# Patient Record
Sex: MALE | Race: WHITE | NOT HISPANIC OR LATINO | Employment: UNEMPLOYED | URBAN - METROPOLITAN AREA
[De-identification: names, ages, dates, MRNs, and addresses within clinical notes are randomized per-mention and may not be internally consistent; named-entity substitution may affect disease eponyms.]

---

## 2017-03-22 ENCOUNTER — APPOINTMENT (OUTPATIENT)
Dept: OCCUPATIONAL MEDICINE | Facility: CLINIC | Age: 37
End: 2017-03-22

## 2017-03-22 PROCEDURE — 99000 SPECIMEN HANDLING OFFICE-LAB: CPT | Performed by: PHYSICIAN ASSISTANT

## 2017-08-08 ENCOUNTER — HOSPITAL ENCOUNTER (EMERGENCY)
Facility: CLINIC | Age: 37
Discharge: HOME | End: 2017-08-08
Attending: FAMILY MEDICINE | Admitting: FAMILY MEDICINE

## 2017-08-08 ENCOUNTER — TELEPHONE (OUTPATIENT)
Dept: FAMILY MEDICINE | Facility: CLINIC | Age: 37
End: 2017-08-08

## 2017-08-08 VITALS
HEIGHT: 72 IN | WEIGHT: 300 LBS | DIASTOLIC BLOOD PRESSURE: 103 MMHG | HEART RATE: 86 BPM | TEMPERATURE: 98.2 F | BODY MASS INDEX: 40.63 KG/M2 | OXYGEN SATURATION: 98 % | SYSTOLIC BLOOD PRESSURE: 200 MMHG | RESPIRATION RATE: 20 BRPM

## 2017-08-08 DIAGNOSIS — I10 HYPERTENSION, UNCONTROLLED: ICD-10-CM

## 2017-08-08 DIAGNOSIS — F10.10 ALCOHOL ABUSE: ICD-10-CM

## 2017-08-08 DIAGNOSIS — R73.9 HYPERGLYCEMIA: ICD-10-CM

## 2017-08-08 DIAGNOSIS — F17.200 TOBACCO DEPENDENCE SYNDROME: ICD-10-CM

## 2017-08-08 DIAGNOSIS — E66.01 MORBID OBESITY DUE TO EXCESS CALORIES (H): ICD-10-CM

## 2017-08-08 LAB
ALBUMIN SERPL-MCNC: 3.7 G/DL (ref 3.4–5)
ALBUMIN UR-MCNC: NEGATIVE MG/DL
ALP SERPL-CCNC: 71 U/L (ref 40–150)
ALT SERPL W P-5'-P-CCNC: 70 U/L (ref 0–70)
ANION GAP SERPL CALCULATED.3IONS-SCNC: 6 MMOL/L (ref 3–14)
APPEARANCE UR: CLEAR
AST SERPL W P-5'-P-CCNC: 70 U/L (ref 0–45)
BASOPHILS # BLD AUTO: 0 10E9/L (ref 0–0.2)
BASOPHILS NFR BLD AUTO: 0.4 %
BILIRUB DIRECT SERPL-MCNC: 0.2 MG/DL (ref 0–0.2)
BILIRUB SERPL-MCNC: 1 MG/DL (ref 0.2–1.3)
BILIRUB UR QL STRIP: NEGATIVE
BUN SERPL-MCNC: 15 MG/DL (ref 7–30)
CALCIUM SERPL-MCNC: 9.2 MG/DL (ref 8.5–10.1)
CHLORIDE SERPL-SCNC: 105 MMOL/L (ref 94–109)
CO2 SERPL-SCNC: 27 MMOL/L (ref 20–32)
COLOR UR AUTO: YELLOW
CREAT SERPL-MCNC: 0.76 MG/DL (ref 0.66–1.25)
DIFFERENTIAL METHOD BLD: NORMAL
EOSINOPHIL # BLD AUTO: 0.3 10E9/L (ref 0–0.7)
EOSINOPHIL NFR BLD AUTO: 3.7 %
ERYTHROCYTE [DISTWIDTH] IN BLOOD BY AUTOMATED COUNT: 12.3 % (ref 10–15)
GFR SERPL CREATININE-BSD FRML MDRD: ABNORMAL ML/MIN/1.7M2
GLUCOSE SERPL-MCNC: 213 MG/DL (ref 70–99)
GLUCOSE UR STRIP-MCNC: >1000 MG/DL
HCT VFR BLD AUTO: 43.8 % (ref 40–53)
HGB BLD-MCNC: 15.2 G/DL (ref 13.3–17.7)
HGB UR QL STRIP: NEGATIVE
IMM GRANULOCYTES # BLD: 0 10E9/L (ref 0–0.4)
IMM GRANULOCYTES NFR BLD: 0.1 %
KETONES UR STRIP-MCNC: NEGATIVE MG/DL
LEUKOCYTE ESTERASE UR QL STRIP: NEGATIVE
LYMPHOCYTES # BLD AUTO: 1.4 10E9/L (ref 0.8–5.3)
LYMPHOCYTES NFR BLD AUTO: 19.7 %
MCH RBC QN AUTO: 31.7 PG (ref 26.5–33)
MCHC RBC AUTO-ENTMCNC: 34.7 G/DL (ref 31.5–36.5)
MCV RBC AUTO: 91 FL (ref 78–100)
MONOCYTES # BLD AUTO: 0.6 10E9/L (ref 0–1.3)
MONOCYTES NFR BLD AUTO: 8.2 %
MUCOUS THREADS #/AREA URNS LPF: PRESENT /LPF
NEUTROPHILS # BLD AUTO: 4.7 10E9/L (ref 1.6–8.3)
NEUTROPHILS NFR BLD AUTO: 67.9 %
NITRATE UR QL: NEGATIVE
PH UR STRIP: 6.5 PH (ref 5–7)
PLATELET # BLD AUTO: 179 10E9/L (ref 150–450)
POTASSIUM SERPL-SCNC: 3.8 MMOL/L (ref 3.4–5.3)
PROT SERPL-MCNC: 7.8 G/DL (ref 6.8–8.8)
RBC # BLD AUTO: 4.8 10E12/L (ref 4.4–5.9)
RBC #/AREA URNS AUTO: <1 /HPF (ref 0–2)
SODIUM SERPL-SCNC: 138 MMOL/L (ref 133–144)
SP GR UR STRIP: 1.01 (ref 1–1.03)
TSH SERPL DL<=0.005 MIU/L-ACNC: 1.41 MU/L (ref 0.4–4)
URN SPEC COLLECT METH UR: ABNORMAL
UROBILINOGEN UR STRIP-MCNC: NORMAL MG/DL (ref 0–2)
WBC # BLD AUTO: 7 10E9/L (ref 4–11)
WBC #/AREA URNS AUTO: <1 /HPF (ref 0–2)

## 2017-08-08 PROCEDURE — 99284 EMERGENCY DEPT VISIT MOD MDM: CPT | Mod: 25 | Performed by: FAMILY MEDICINE

## 2017-08-08 PROCEDURE — 81001 URINALYSIS AUTO W/SCOPE: CPT | Performed by: FAMILY MEDICINE

## 2017-08-08 PROCEDURE — 83036 HEMOGLOBIN GLYCOSYLATED A1C: CPT | Performed by: PHYSICIAN ASSISTANT

## 2017-08-08 PROCEDURE — 93005 ELECTROCARDIOGRAM TRACING: CPT | Performed by: FAMILY MEDICINE

## 2017-08-08 PROCEDURE — 80076 HEPATIC FUNCTION PANEL: CPT | Performed by: FAMILY MEDICINE

## 2017-08-08 PROCEDURE — 93010 ELECTROCARDIOGRAM REPORT: CPT | Mod: Z6 | Performed by: FAMILY MEDICINE

## 2017-08-08 PROCEDURE — 80048 BASIC METABOLIC PNL TOTAL CA: CPT | Performed by: FAMILY MEDICINE

## 2017-08-08 PROCEDURE — 84443 ASSAY THYROID STIM HORMONE: CPT | Performed by: FAMILY MEDICINE

## 2017-08-08 PROCEDURE — 99284 EMERGENCY DEPT VISIT MOD MDM: CPT | Performed by: FAMILY MEDICINE

## 2017-08-08 PROCEDURE — 85025 COMPLETE CBC W/AUTO DIFF WBC: CPT | Performed by: FAMILY MEDICINE

## 2017-08-08 PROCEDURE — 36415 COLL VENOUS BLD VENIPUNCTURE: CPT | Performed by: PHYSICIAN ASSISTANT

## 2017-08-08 RX ORDER — LISINOPRIL/HYDROCHLOROTHIAZIDE 10-12.5 MG
1 TABLET ORAL DAILY
Qty: 30 TABLET | Refills: 1 | Status: SHIPPED | OUTPATIENT
Start: 2017-08-08 | End: 2017-10-17

## 2017-08-08 NOTE — ED AVS SNAPSHOT
[facility] Emergency Department    [address] [facility] [address]    Phone:  [phone]    Fax:  [phone]                                       Mt Painting   MRN: [id number]    Department:  [facility] Emergency Department   Date of Visit:  8/8/2017           Patient Information     Date Of Birth          1980        Your diagnoses for this visit were:     Hypertension, uncontrolled     Tobacco dependence syndrome     Alcohol abuse     Morbid obesity due to excess calories (H)     Hyperglycemia        You were seen by Hannah, Froylan FITCH MD.      Follow-up Information     Follow up with [facility] Emergency Department.    Specialty:  EMERGENCY MEDICINE    Why:  If symptoms worsen    Contact information:    [address] [facility] [address] [phone]    Additional information:    The medical center is located at   [address] [facility]. (between [address]   [address] in [place], [place] miles north   of [place]).        Follow up with Vern Wheatley PA-C.    Specialty:  Physician Assistant    Why:  Appointment made for 8/10/17 at 3:20 PM    Contact information:    [address] [place] [address]  [phone]        Discharge References/Attachments     ALCOHOL ABUSE (ENGLISH)    DIABETES, DO YOU HAVE? (ENGLISH)    HIGH BLOOD PRESSURE (HYPERTENSION), DISCHARGE INSTRUCTIONS (ENGLISH)    SMOKING, TIPS FOR QUITTING (CARDIOVASCULAR) (ENGLISH)    WEIGHT MANAGEMENT: EXERCISE AND ACTIVITY (ENGLISH)      Future Appointments        Provider Department Dept Phone Center    8/10/2017 3:20 PM Vern Wheatley PA-C [facility] [phone] [facility]      24 Hour Appointment Hotline       To make an appointment at any [facility], call [phone] ([phone]). If you don't have a family doctor or clinic, we will help you find one. [facility] clinics are conveniently located to serve the needs of you and your family.             Review of your  medicines      START taking        Dose / Directions Last dose taken    lisinopril-hydrochlorothiazide 10-12.5 MG per tablet   Commonly known as:  PRINZIDE/ZESTORETIC   Dose:  1 tablet   Quantity:  30 tablet        Take 1 tablet by mouth daily   Refills:  1                Prescriptions were sent or printed at these locations (1 Prescription)                   [id number] - [place], [place]  [address]   [address] [facility] [address]    Telephone:  [phone]   Fax:  [phone]   Hours:  Closed 10-16-08 business to [facility]                E-Prescribed (1 of 1)         lisinopril-hydrochlorothiazide (PRINZIDE/ZESTORETIC) 10-12.5 MG per tablet                Procedures and tests performed during your visit     Basic metabolic panel    CBC with platelets differential    EKG 12-lead    Hepatic panel    TSH with free T4 reflex    UA with Microscopic reflex to Culture      Orders Needing Specimen Collection     None      Pending Results     No orders found from 8/6/2017 to 8/9/2017.            Pending Culture Results     No orders found from 8/6/2017 to 8/9/2017.            Pending Results Instructions     If you had any lab results that were not finalized at the time of your Discharge, you can call the ED Lab Result RN at [phone]. You will be contacted by this team for any positive Lab results or changes in treatment. The nurses are available 7 days a week from 10A to 6:30P.  You can leave a message 24 hours per day and they will return your call.        Test Results From Your Hospital Stay        8/8/2017  1:10 PM      Component Results     Component Value Ref Range & Units Status    WBC 7.0 4.0 - 11.0 10e9/L Final    RBC Count 4.80 4.4 - 5.9 10e12/L Final    Hemoglobin 15.2 13.3 - 17.7 g/dL Final    Hematocrit 43.8 40.0 - 53.0 % Final    MCV 91 78 - 100 fl Final    MCH 31.7 26.5 - 33.0 pg Final    MCHC 34.7 31.5 - 36.5 g/dL Final    RDW 12.3 10.0 - 15.0 % Final    Platelet  Count 179 150 - 450 10e9/L Final    Diff Method Automated Method  Final    % Neutrophils 67.9 % Final    % Lymphocytes 19.7 % Final    % Monocytes 8.2 % Final    % Eosinophils 3.7 % Final    % Basophils 0.4 % Final    % Immature Granulocytes 0.1 % Final    Absolute Neutrophil 4.7 1.6 - 8.3 10e9/L Final    Absolute Lymphocytes 1.4 0.8 - 5.3 10e9/L Final    Absolute Monocytes 0.6 0.0 - 1.3 10e9/L Final    Absolute Eosinophils 0.3 0.0 - 0.7 10e9/L Final    Absolute Basophils 0.0 0.0 - 0.2 10e9/L Final    Abs Immature Granulocytes 0.0 0 - 0.4 10e9/L Final         8/8/2017  1:24 PM      Component Results     Component Value Ref Range & Units Status    Sodium 138 133 - 144 mmol/L Final    Potassium 3.8 3.4 - 5.3 mmol/L Final    Chloride 105 94 - 109 mmol/L Final    Carbon Dioxide 27 20 - 32 mmol/L Final    Anion Gap 6 3 - 14 mmol/L Final    Glucose 213 (H) 70 - 99 mg/dL Final    Urea Nitrogen 15 7 - 30 mg/dL Final    Creatinine 0.76 0.66 - 1.25 mg/dL Final    GFR Estimate >90  Non  GFR Calc   >60 mL/min/1.7m2 Final    GFR Estimate If Black >90   GFR Calc   >60 mL/min/1.7m2 Final    Calcium 9.2 8.5 - 10.1 mg/dL Final         8/8/2017  1:27 PM      Component Results     Component Value Ref Range & Units Status    Bilirubin Direct 0.2 0.0 - 0.2 mg/dL Final    Bilirubin Total 1.0 0.2 - 1.3 mg/dL Final    Albumin 3.7 3.4 - 5.0 g/dL Final    Protein Total 7.8 6.8 - 8.8 g/dL Final    Alkaline Phosphatase 71 40 - 150 U/L Final    ALT 70 0 - 70 U/L Final    AST 70 (H) 0 - 45 U/L Final         8/8/2017  1:32 PM      Component Results     Component Value Ref Range & Units Status    TSH 1.41 0.40 - 4.00 mU/L Final         8/8/2017  4:01 PM      Component Results     Component Value Ref Range & Units Status    Color Urine Yellow  Final    Appearance Urine Clear  Final    Glucose Urine >1000 (A) NEG mg/dL Final    Bilirubin Urine Negative NEG Final    Ketones Urine Negative NEG mg/dL Final    Specific  "[place] Urine 1.006 1.003 - 1.035 Final    Blood Urine Negative NEG Final    pH Urine 6.5 5.0 - 7.0 pH Final    Protein Albumin Urine Negative NEG mg/dL Final    Urobilinogen mg/dL Normal 0.0 - 2.0 mg/dL Final    Nitrite Urine Negative NEG Final    Leukocyte Esterase Urine Negative NEG Final    Source Midstream Urine  Final    WBC Urine <1 0 - 2 /HPF Final    RBC Urine <1 0 - 2 /HPF Final    Mucous Urine Present (A) NEG /LPF Final                Thank you for choosing [facility]       Thank you for choosing [facility] for your care. Our goal is always to provide you with excellent care. Hearing back from our patients is one way we can continue to improve our services. Please take a few minutes to complete the written survey that you may receive in the mail after you visit with us. Thank you!        [employer] Information     [employer] lets you send messages to your doctor, view your test results, renew your prescriptions, schedule appointments and more. To sign up, go to [email or web address] . Click on \"Log in\" on the left side of the screen, which will take you to the Welcome page. Then click on \"Sign up Now\" on the right side of the page.     You will be asked to enter the access code listed below, as well as some personal information. Please follow the directions to create your username and password.     Your access code is: [email or web address]  Expires: 2017  4:31 PM     Your access code will  in 90 days. If you need help or a new code, please call your [facility] clinic or [phone].        Care EveryWhere ID     This is your Care EveryWhere ID. This could be used by other organizations to access your [facility] medical records  [id number]        Equal Access to Services     LAURA EDWARDS : Hadii pradip Agarwal, dasha norman, aicha kaalmada charlotte, rosalinda sherman. So [facility] [phone].    ATENCIÓN: Si habla español, tiene a cramer disposición servicios gratuitos de " asistencia lingüística. Jhonny al [phone].    We comply with applicable federal civil rights laws and [place] laws. We do not discriminate on the basis of race, color, national origin, age, disability sex, sexual orientation or gender identity.            After Visit Summary       This is your record. Keep this with you and show to your community pharmacist(s) and doctor(s) at your next visit.

## 2017-08-08 NOTE — TELEPHONE ENCOUNTER
Reason for Call:  Other     Detailed comments: Patient has chest pressure on left side of chest - please call pt   Phone Number Patient can be reached at: Home number on file [phone] (home)    Best Time:     Can we leave a detailed message on this number? YES    Call taken on 8/8/2017 at 8:52 AM by Barbi Trinh

## 2017-08-08 NOTE — ED AVS SNAPSHOT
[facility] Emergency Department    [address] [facility] [address]    Phone:  [phone]    Fax:  [phone]                                       Mt Painting   MRN: [id number]    Department:  [facility] Emergency Department   Date of Visit:  8/8/2017           After Visit Summary Signature Page     I have received my discharge instructions, and my questions have been answered. I have discussed any challenges I see with this plan with the nurse or doctor.    ..........................................................................................................................................  Patient/Patient Representative Signature      ..........................................................................................................................................  Patient Representative Print Name and Relationship to Patient    ..................................................               ................................................  Date                                            Time    ..........................................................................................................................................  Reviewed by Signature/Title    ...................................................              ..............................................  Date                                                            Time
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
## 2017-08-08 NOTE — TELEPHONE ENCOUNTER
Mt has had left side chest pressure for the past 2 weeks, he denies SOA, nausea, sweating or increased pain with activity. He also says his BP has been elevated for some time as well. Advised he needs to go to ED for evaluation of this chest pressure, he agrees and will have someone drive him there this morning.

## 2017-08-08 NOTE — ED NOTES
"Mid upper left chest pain started within last couple of weeks, increased Sunday night and unable to sleep.  Chest pain continued to \"bother\" pt yesterday and pt decided to come into today.   Denies n/v, diaphoresis.   Pt reports increased pain \"if I smoke a lot\"   Pt smoke 1 1/2 ppd.   Pt reports high blood pressure for almost 1 year.   Pt was seen in June and was told to follow up for hypertension but pt has not.     "

## 2017-08-09 ENCOUNTER — OFFICE VISIT (OUTPATIENT)
Dept: FAMILY MEDICINE | Facility: CLINIC | Age: 37
End: 2017-08-09

## 2017-08-09 VITALS
DIASTOLIC BLOOD PRESSURE: 90 MMHG | TEMPERATURE: 97.1 F | BODY MASS INDEX: 38.71 KG/M2 | HEART RATE: 100 BPM | SYSTOLIC BLOOD PRESSURE: 160 MMHG | RESPIRATION RATE: 20 BRPM | WEIGHT: 285.4 LBS

## 2017-08-09 DIAGNOSIS — E11.9 TYPE 2 DIABETES MELLITUS WITHOUT COMPLICATION, WITHOUT LONG-TERM CURRENT USE OF INSULIN (H): ICD-10-CM

## 2017-08-09 DIAGNOSIS — L30.9 DERMATITIS: ICD-10-CM

## 2017-08-09 DIAGNOSIS — I10 BENIGN ESSENTIAL HYPERTENSION: Primary | ICD-10-CM

## 2017-08-09 LAB — HBA1C MFR BLD: 7.5 % (ref 4.3–6)

## 2017-08-09 PROCEDURE — 99214 OFFICE O/P EST MOD 30 MIN: CPT | Performed by: PHYSICIAN ASSISTANT

## 2017-08-09 RX ORDER — METFORMIN HCL 500 MG
500 TABLET, EXTENDED RELEASE 24 HR ORAL
Qty: 60 TABLET | Refills: 1 | Status: SHIPPED | OUTPATIENT
Start: 2017-08-09 | End: 2018-09-25

## 2017-08-09 RX ORDER — TRIAMCINOLONE ACETONIDE 1 MG/G
CREAM TOPICAL
Qty: 80 G | Refills: 3 | Status: SHIPPED | OUTPATIENT
Start: 2017-08-09 | End: 2018-09-25

## 2017-08-09 ASSESSMENT — ENCOUNTER SYMPTOMS
HALLUCINATIONS: 0
SHORTNESS OF BREATH: 0
DOUBLE VISION: 0
EYE REDNESS: 0
TINGLING: 0
NECK PAIN: 0
DIAPHORESIS: 0
NERVOUS/ANXIOUS: 0
PALPITATIONS: 0
LOSS OF CONSCIOUSNESS: 0
BLOOD IN STOOL: 0
FREQUENCY: 0
MYALGIAS: 0
HEARTBURN: 0
VOMITING: 0
PHOTOPHOBIA: 0
FEVER: 0
WHEEZING: 0
HEADACHES: 0
BACK PAIN: 0
WEAKNESS: 0
WEIGHT LOSS: 0
NEUROLOGICAL NEGATIVE: 1
BLURRED VISION: 0
COUGH: 0
DYSURIA: 0
SPUTUM PRODUCTION: 0
HEMOPTYSIS: 0
FOCAL WEAKNESS: 0
ORTHOPNEA: 0
ABDOMINAL PAIN: 0
SENSORY CHANGE: 0
EYE DISCHARGE: 0
SORE THROAT: 0
SEIZURES: 0
INSOMNIA: 0
NAUSEA: 0
EYE PAIN: 0
DEPRESSION: 0
DIZZINESS: 0
CONSTIPATION: 0
DIARRHEA: 0

## 2017-08-09 ASSESSMENT — LIFESTYLE VARIABLES: SUBSTANCE_ABUSE: 0

## 2017-08-09 NOTE — NURSING NOTE
Chief Complaint   Patient presents with     Hypertension       Initial /90 (BP Location: Right arm, Patient Position: Chair, Cuff Size: Adult Large)  Pulse 100  Temp 97.1  F (36.2  C) (Tympanic)  Resp 20  Wt 285 lb 6.4 oz (129.5 kg)  BMI 38.71 kg/m2 Estimated body mass index is 38.71 kg/(m^2) as calculated from the following:    Height as of 8/8/17: 6' (1.829 m).    Weight as of this encounter: 285 lb 6.4 oz (129.5 kg).  Medication Reconciliation: complete    Health Maintenance that is potentially due pending provider review:  NONE    n/a    Is there anyone who you would like to be able to receive your results? Yes  If yes have patient fill out ILEANA TAN CMA
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
## 2017-08-09 NOTE — MR AVS SNAPSHOT
"              After Visit Summary   2017    Mt Painting    MRN: [id number]           Patient Information     Date Of Birth          1980        Visit Information        Provider Department      2017 11:20 AM Vern Wheatley PA-C [facility]        Today's Diagnoses     Benign essential hypertension    -  1    Dermatitis           Follow-ups after your visit        Follow-up notes from your care team     Return if symptoms worsen or fail to improve.      Who to contact     If you have questions or need follow up information about today's clinic visit or your schedule please contact [facility] directly at [phone].  Normal or non-critical lab and imaging results will be communicated to you by tuta.cohart, letter or phone within 4 business days after the clinic has received the results. If you do not hear from us within 7 days, please contact the clinic through [email or web address] or phone. If you have a critical or abnormal lab result, we will notify you by phone as soon as possible.  Submit refill requests through Cirqle or call your pharmacy and they will forward the refill request to us. Please allow 3 business days for your refill to be completed.          Additional Information About Your Visit        MyChart Information     Cirqle lets you send messages to your doctor, view your test results, renew your prescriptions, schedule appointments and more. To sign up, go to [email or web address] . Click on \"Log in\" on the left side of the screen, which will take you to the Welcome page. Then click on \"Sign up Now\" on the right side of the page.     You will be asked to enter the access code listed below, as well as some personal information. Please follow the directions to create your username and password.     Your access code is: [email or web address]  Expires: 2017  4:31 PM     Your access code will  in 90 days. If you need help or a new code, please call your [place] " clinic or [phone].        Care EveryWhere ID     This is your Care EveryWhere ID. This could be used by other organizations to access your [place] medical records  [id number]        Your Vitals Were     Pulse Temperature Respirations BMI (Body Mass Index)          100 97.1  F (36.2  C) (Tympanic) 20 38.71 kg/m2         Blood Pressure from Last 3 Encounters:   08/09/17 160/90   08/08/17 (!) 200/103   04/21/16 138/88    Weight from Last 3 Encounters:   08/09/17 285 lb 6.4 oz (129.5 kg)   08/08/17 300 lb (136.1 kg)   04/21/16 (!) 310 lb 9.6 oz (140.9 kg)              We Performed the Following     Hemoglobin A1c          Today's Medication Changes          These changes are accurate as of: 8/9/17  1:13 PM.  If you have any questions, ask your nurse or doctor.               Start taking these medicines.        Dose/Directions    triamcinolone 0.1 % cream   Commonly known as:  KENALOG   Used for:  Dermatitis   Started by:  Vern Wheatley PA-C        Apply sparingly to affected area three times daily   Quantity:  80 g   Refills:  3            Where to get your medicines      These medications were sent to [facility] #[id number] [address] [address]    Hours:  Closed 10-16-08 business to [facility] Phone:  [phone]     triamcinolone 0.1 % cream                Primary Care Provider Office Phone # Fax #    Vern Wheatley PA-C [phone] [address] [facility] [address]        Equal Access to Services     DAMION EDWARDS AH: Hadhaley Agarwal, dasha norman, qarosalinda north. So [facility] [phone].    ATENCIÓN: Si habla español, tiene a cramer disposición servicios gratuitos de asistencia lingüística. Llame al [phone].    We comply with applicable federal civil rights laws and [place] laws. We do not discriminate on the basis of race, color, national origin, age,  disability sex, sexual orientation or gender identity.            Thank you!     Thank you for choosing [facility]  for your care. Our goal is always to provide you with excellent care. Hearing back from our patients is one way we can continue to improve our services. Please take a few minutes to complete the written survey that you may receive in the mail after your visit with us. Thank you!             Your Updated Medication List - Protect others around you: Learn how to safely use, store and throw away your medicines at www.disposemymeds.org.          This list is accurate as of: 8/9/17  1:13 PM.  Always use your most recent med list.                   Brand Name Dispense Instructions for use Diagnosis    lisinopril-hydrochlorothiazide 10-12.5 MG per tablet    PRINZIDE/ZESTORETIC    30 tablet    Take 1 tablet by mouth daily        triamcinolone 0.1 % cream    KENALOG    80 g    Apply sparingly to affected area three times daily    Dermatitis

## 2017-08-14 PROBLEM — I10 BENIGN ESSENTIAL HYPERTENSION: Status: ACTIVE | Noted: 2017-08-14

## 2017-08-14 PROBLEM — E11.9 TYPE 2 DIABETES MELLITUS WITHOUT COMPLICATION, WITHOUT LONG-TERM CURRENT USE OF INSULIN (H): Status: ACTIVE | Noted: 2017-08-14

## 2017-10-06 ENCOUNTER — TELEPHONE (OUTPATIENT)
Dept: FAMILY MEDICINE | Facility: CLINIC | Age: 37
End: 2017-10-06

## 2017-10-06 NOTE — TELEPHONE ENCOUNTER
Panel Management Review      Patient has the following on his problem list:     Diabetes    ASA: Not Required     Last A1C  Lab Results   Component Value Date    A1C 7.5 08/08/2017     A1C tested: Passed    Last LDL:    No results found for: CHOL  No results found for: HDL  No results found for: LDL  No results found for: TRIG  No results found for: CHOLHDLRATIO  No results found for: NHDL    Is the patient on a Statin? NO             Is the patient on Aspirin? NO        Last three blood pressure readings:  BP Readings from Last 3 Encounters:   08/09/17 160/90   08/08/17 (!) 200/103   04/21/16 138/88       Date of last diabetes office visit: 8/9/17     Tobacco History:     History   Smoking Status     Current Every Day Smoker     Packs/day: 1.00     Types: Cigarettes   Smokeless Tobacco     Current User         Hypertension   Last three blood pressure readings:  BP Readings from Last 3 Encounters:   08/09/17 160/90   08/08/17 (!) 200/103   04/21/16 138/88     Blood pressure: FAILED    HTN Guidelines:  Age 18-59 BP range:  Less than 140/90  Age 60-85 with Diabetes:  Less than 140/90  Age 60-85 without Diabetes:  less than 150/90          Composite cancer screening  Chart review shows that this patient is due/due soon for the following None  Summary:    Patient is due/failing the following:   BP CHECK    Action needed:   Patient needs nurse only appointment.    Type of outreach:    Phone, left message for patient to call back.     Questions for provider review:    None                                                                                                                                    Ju TAN CMA       Chart routed to Care Team .

## 2017-10-13 NOTE — TELEPHONE ENCOUNTER
Left second message for patient to call the clinic.   Patient is due for a blood pressure check.  Ju TAN CMA

## 2017-10-17 ENCOUNTER — OFFICE VISIT (OUTPATIENT)
Dept: FAMILY MEDICINE | Facility: CLINIC | Age: 37
End: 2017-10-17

## 2017-10-17 VITALS
BODY MASS INDEX: 39.82 KG/M2 | TEMPERATURE: 97.7 F | DIASTOLIC BLOOD PRESSURE: 88 MMHG | HEIGHT: 72 IN | RESPIRATION RATE: 16 BRPM | WEIGHT: 294 LBS | HEART RATE: 92 BPM | SYSTOLIC BLOOD PRESSURE: 132 MMHG

## 2017-10-17 DIAGNOSIS — F33.0 MILD EPISODE OF RECURRENT MAJOR DEPRESSIVE DISORDER (H): ICD-10-CM

## 2017-10-17 DIAGNOSIS — I10 BENIGN ESSENTIAL HYPERTENSION: ICD-10-CM

## 2017-10-17 DIAGNOSIS — E11.9 TYPE 2 DIABETES MELLITUS WITHOUT COMPLICATION, WITHOUT LONG-TERM CURRENT USE OF INSULIN (H): Primary | ICD-10-CM

## 2017-10-17 PROCEDURE — 99214 OFFICE O/P EST MOD 30 MIN: CPT | Performed by: PHYSICIAN ASSISTANT

## 2017-10-17 RX ORDER — CITALOPRAM HYDROBROMIDE 20 MG/1
20 TABLET ORAL DAILY
Qty: 30 TABLET | Refills: 1 | Status: SHIPPED | OUTPATIENT
Start: 2017-10-17 | End: 2018-09-25

## 2017-10-17 RX ORDER — LISINOPRIL/HYDROCHLOROTHIAZIDE 10-12.5 MG
1 TABLET ORAL DAILY
Qty: 90 TABLET | Refills: 3 | Status: SHIPPED | OUTPATIENT
Start: 2017-10-17 | End: 2018-12-14

## 2017-10-17 ASSESSMENT — ENCOUNTER SYMPTOMS
MYALGIAS: 0
LOSS OF CONSCIOUSNESS: 0
SHORTNESS OF BREATH: 0
BACK PAIN: 0
ABDOMINAL PAIN: 0
SPUTUM PRODUCTION: 0
FEVER: 0
EYE REDNESS: 0
HEARTBURN: 0
CONSTIPATION: 0
DIAPHORESIS: 0
SORE THROAT: 0
DEPRESSION: 1
BLURRED VISION: 0
SENSORY CHANGE: 0
NEUROLOGICAL NEGATIVE: 1
FREQUENCY: 0
DIZZINESS: 0
NAUSEA: 0
EYE DISCHARGE: 0
HEMOPTYSIS: 0
WEIGHT LOSS: 0
DOUBLE VISION: 0
PHOTOPHOBIA: 0
COUGH: 0
HEADACHES: 0
VOMITING: 0
NERVOUS/ANXIOUS: 0
NECK PAIN: 0
FOCAL WEAKNESS: 0
SEIZURES: 0
WEAKNESS: 0
EYE PAIN: 0
ORTHOPNEA: 0
HALLUCINATIONS: 0
TINGLING: 0
DIARRHEA: 0
DYSURIA: 0
WHEEZING: 0
INSOMNIA: 0
BLOOD IN STOOL: 0
PALPITATIONS: 0

## 2017-10-17 ASSESSMENT — LIFESTYLE VARIABLES: SUBSTANCE_ABUSE: 0

## 2017-10-17 NOTE — MR AVS SNAPSHOT
"              After Visit Summary   10/17/2017    Mt Painting    MRN: [id number]           Patient Information     Date Of Birth          1980        Visit Information        Provider Department      10/17/2017 1:00 PM Vern Wheatley PA-C [facility]        Today's Diagnoses     Type 2 diabetes mellitus without complication, without long-term current use of insulin (H)    -  1    Benign essential hypertension        Mild episode of recurrent major depressive disorder (H)           Follow-ups after your visit        Follow-up notes from your care team     Return if symptoms worsen or fail to improve.      Who to contact     If you have questions or need follow up information about today's clinic visit or your schedule please contact [facility] directly at [phone].  Normal or non-critical lab and imaging results will be communicated to you by [employer] Diagnostics[employer], letter or phone within 4 business days after the clinic has received the results. If you do not hear from us within 7 days, please contact the clinic through [employer] or phone. If you have a critical or abnormal lab result, we will notify you by phone as soon as possible.  Submit refill requests through Cabify or call your pharmacy and they will forward the refill request to us. Please allow 3 business days for your refill to be completed.          Additional Information About Your Visit        MyChart Information     Cabify lets you send messages to your doctor, view your test results, renew your prescriptions, schedule appointments and more. To sign up, go to [email or web address] . Click on \"Log in\" on the left side of the screen, which will take you to the Welcome page. Then click on \"Sign up Now\" on the right side of the page.     You will be asked to enter the access code listed below, as well as some personal information. Please follow the directions to create your username and password.     Your access code " is: [email or web address]  Expires: 2017  4:31 PM     Your access code will  in 90 days. If you need help or a new code, please call your [place] clinic or [phone].        Care EveryWhere ID     This is your Care EveryWhere ID. This could be used by other organizations to access your [place] medical records  [id number]        Your Vitals Were     Pulse Temperature Respirations Height BMI (Body Mass Index)       92 97.7  F (36.5  C) (Tympanic) 16 6' (1.829 m) 39.87 kg/m2        Blood Pressure from Last 3 Encounters:   10/17/17 132/88   17 160/90   17 (!) 200/103    Weight from Last 3 Encounters:   10/17/17 294 lb (133.4 kg)   17 285 lb 6.4 oz (129.5 kg)   17 300 lb (136.1 kg)              Today, you had the following     No orders found for display         Today's Medication Changes          These changes are accurate as of: 10/17/17  2:06 PM.  If you have any questions, ask your nurse or doctor.               Start taking these medicines.        Dose/Directions    citalopram 20 MG tablet   Commonly known as:  celeXA   Used for:  Mild episode of recurrent major depressive disorder (H)   Started by:  Vern Wheatley PA-C        Dose:  20 mg   Take 1 tablet (20 mg) by mouth daily   Quantity:  30 tablet   Refills:  1            Where to get your medicines      These medications were sent to [facility] #[id number] [facility] [address] [facility]  [address] [place] [address]    Hours:  Closed 10-16-08 business to [facility] Phone:  [phone]     citalopram 20 MG tablet    lisinopril-hydrochlorothiazide 10-12.5 MG per tablet                Primary Care Provider Office Phone # Fax #    Vern Wheatley PA-C [phone] [address] [facility] [address]        Equal Access to Services     DAMION EDWARDS AH: Hadii pradip blairo Soruben, waaxda luqadaha, qaybta karosalinda viveros. So [facility]  [phone].    ATENCIÓN: Si carole daniels, tiene a cramer disposición servicios gratuitos de asistencia lingüística. Jhonny hi [phone].    We comply with applicable federal civil rights laws and [place] laws. We do not discriminate on the basis of race, color, national origin, age, disability, sex, sexual orientation, or gender identity.            Thank you!     Thank you for choosing [facility]  for your care. Our goal is always to provide you with excellent care. Hearing back from our patients is one way we can continue to improve our services. Please take a few minutes to complete the written survey that you may receive in the mail after your visit with us. Thank you!             Your Updated Medication List - Protect others around you: Learn how to safely use, store and throw away your medicines at www.disposemymeds.org.          This list is accurate as of: 10/17/17  2:06 PM.  Always use your most recent med list.                   Brand Name Dispense Instructions for use Diagnosis    citalopram 20 MG tablet    celeXA    30 tablet    Take 1 tablet (20 mg) by mouth daily    Mild episode of recurrent major depressive disorder (H)       lisinopril-hydrochlorothiazide 10-12.5 MG per tablet    PRINZIDE/ZESTORETIC    90 tablet    Take 1 tablet by mouth daily    Benign essential hypertension       metFORMIN 500 MG 24 hr tablet    GLUCOPHAGE-XR    60 tablet    Take 1 tablet (500 mg) by mouth daily (with dinner)    Type 2 diabetes mellitus without complication, without long-term current use of insulin (H)       triamcinolone 0.1 % cream    KENALOG    80 g    Apply sparingly to affected area three times daily    Dermatitis
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
## 2017-10-17 NOTE — NURSING NOTE
Chief Complaint   Patient presents with     Hypertension       Initial BP (!) 155/105 (BP Location: Right arm, Patient Position: Chair, Cuff Size: Adult Large)  Pulse 92  Temp 97.7  F (36.5  C) (Tympanic)  Resp 16  Ht 6' (1.829 m)  Wt 294 lb (133.4 kg)  BMI 39.87 kg/m2 Estimated body mass index is 39.87 kg/(m^2) as calculated from the following:    Height as of this encounter: 6' (1.829 m).    Weight as of this encounter: 294 lb (133.4 kg).  Medication Reconciliation: complete    Health Maintenance that is potentially due pending provider review:  NONE        Is there anyone who you would like to be able to receive your results? No  If yes have patient fill out ILEANA

## 2017-10-23 PROBLEM — F33.0 MILD EPISODE OF RECURRENT MAJOR DEPRESSIVE DISORDER (H): Status: ACTIVE | Noted: 2017-10-23

## 2017-12-15 ENCOUNTER — TELEPHONE (OUTPATIENT)
Dept: FAMILY MEDICINE | Facility: CLINIC | Age: 37
End: 2017-12-15

## 2017-12-15 NOTE — TELEPHONE ENCOUNTER
Left message for patient to call the clinic.  We were reviewing charts left message for patient to make a follow up appointment with Vikas TAN CMA

## 2017-12-27 ENCOUNTER — OFFICE VISIT (OUTPATIENT)
Dept: FAMILY MEDICINE | Facility: CLINIC | Age: 37
End: 2017-12-27
Payer: COMMERCIAL

## 2017-12-27 VITALS
TEMPERATURE: 98.9 F | HEART RATE: 100 BPM | SYSTOLIC BLOOD PRESSURE: 138 MMHG | HEIGHT: 72 IN | WEIGHT: 282 LBS | BODY MASS INDEX: 38.19 KG/M2 | DIASTOLIC BLOOD PRESSURE: 100 MMHG

## 2017-12-27 DIAGNOSIS — Z30.09 ENCOUNTER FOR VASECTOMY COUNSELING: Primary | ICD-10-CM

## 2017-12-27 PROCEDURE — 99213 OFFICE O/P EST LOW 20 MIN: CPT | Performed by: FAMILY MEDICINE

## 2017-12-27 NOTE — MR AVS SNAPSHOT
"              After Visit Summary   12/27/2017    Mt Painting    MRN: [id number]           Patient Information     Date Of Birth          1980        Visit Information        Provider Department      12/27/2017 4:00 PM Francisco J Wilson MD [facility]        Today's Diagnoses     Encounter for vasectomy counseling    -  1       Follow-ups after your visit        Your next 10 appointments already scheduled     Dec 29, 2017  3:40 PM CST   SHORT with Francisco J Wilson MD   [facility] ([facility])    [address] [place] [address] [phone]              Who to contact     If you have questions or need follow up information about today's clinic visit or your schedule please contact [facility] directly at [phone].  Normal or non-critical lab and imaging results will be communicated to you by MyChart, letter or phone within 4 business days after the clinic has received the results. If you do not hear from us within 7 days, please contact the clinic through MyChart or phone. If you have a critical or abnormal lab result, we will notify you by phone as soon as possible.  Submit refill requests through [employer] or call your pharmacy and they will forward the refill request to us. Please allow 3 business days for your refill to be completed.          Additional Information About Your Visit        MyChart Information     [employer] lets you send messages to your doctor, view your test results, renew your prescriptions, schedule appointments and more. To sign up, go to [email or web address] . Click on \"Log in\" on the left side of the screen, which will take you to the Welcome page. Then click on \"Sign up Now\" on the right side of the page.     You will be asked to enter the access code listed below, as well as some personal information. Please follow the directions to create your username and password.     Your access " code is: [email or web address]  Expires: 3/27/2018  5:16 PM     Your access code will  in 90 days. If you need help or a new code, please call your [place] clinic or [phone].        Care EveryWhere ID     This is your Care EveryWhere ID. This could be used by other organizations to access your [place] medical records  [id number]        Your Vitals Were     Pulse Temperature Height BMI (Body Mass Index)          100 98.9  F (37.2  C) (Tympanic) 6' (1.829 m) 38.25 kg/m2         Blood Pressure from Last 3 Encounters:   17 (!) 138/100   10/17/17 132/88   17 160/90    Weight from Last 3 Encounters:   17 282 lb (127.9 kg)   10/17/17 294 lb (133.4 kg)   17 285 lb 6.4 oz (129.5 kg)              Today, you had the following     No orders found for display       Primary Care Provider Office Phone # Fax #    Vern Wheatley PA-C [phone] [address] [place] [address]        Equal Access to Services     DAMION EDWARDS AH: Hadii pradip ku hadarleyo Soruben, waaxda luqadaha, qaybta kaalmada adeegyada, rosalinda sherman. So [facility] [phone].    ATENCIÓN: Si habla español, tiene a cramer disposición servicios gratuitos de asistencia lingüística. [facility] [phone].    We comply with applicable federal civil rights laws and [place] laws. We do not discriminate on the basis of race, color, national origin, age, disability, sex, sexual orientation, or gender identity.            Thank you!     Thank you for choosing [facility]  for your care. Our goal is always to provide you with excellent care. Hearing back from our patients is one way we can continue to improve our services. Please take a few minutes to complete the written survey that you may receive in the mail after your visit with us. Thank you!             Your Updated Medication List - Protect others around you: Learn how to safely use, store and throw away your medicines at  www.disposemymeds.org.          This list is accurate as of: 12/27/17  5:16 PM.  Always use your most recent med list.                   Brand Name Dispense Instructions for use Diagnosis    citalopram 20 MG tablet    celeXA    30 tablet    Take 1 tablet (20 mg) by mouth daily    Mild episode of recurrent major depressive disorder (H)       lisinopril-hydrochlorothiazide 10-12.5 MG per tablet    PRINZIDE/ZESTORETIC    90 tablet    Take 1 tablet by mouth daily    Benign essential hypertension       metFORMIN 500 MG 24 hr tablet    GLUCOPHAGE-XR    60 tablet    Take 1 tablet (500 mg) by mouth daily (with dinner)    Type 2 diabetes mellitus without complication, without long-term current use of insulin (H)       triamcinolone 0.1 % cream    KENALOG    80 g    Apply sparingly to affected area three times daily    Dermatitis
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
## 2017-12-27 NOTE — NURSING NOTE
Chief Complaint   Patient presents with     Consult       Initial BP (!) 138/100  Pulse 100  Temp 98.9  F (37.2  C) (Tympanic)  Ht 6' (1.829 m)  Wt 282 lb (127.9 kg)  BMI 38.25 kg/m2 Estimated body mass index is 38.25 kg/(m^2) as calculated from the following:    Height as of this encounter: 6' (1.829 m).    Weight as of this encounter: 282 lb (127.9 kg).  Medication Reconciliation: complete    Health Maintenance that is potentially due pending provider review:  Blood pressure elevated        Is there anyone who you would like to be able to receive your results? No       If yes have patient fill out ILEANA

## 2017-12-29 ENCOUNTER — OFFICE VISIT (OUTPATIENT)
Dept: FAMILY MEDICINE | Facility: CLINIC | Age: 37
End: 2017-12-29
Payer: COMMERCIAL

## 2017-12-29 VITALS
TEMPERATURE: 98.8 F | BODY MASS INDEX: 38.25 KG/M2 | DIASTOLIC BLOOD PRESSURE: 96 MMHG | WEIGHT: 282 LBS | SYSTOLIC BLOOD PRESSURE: 160 MMHG | RESPIRATION RATE: 20 BRPM | HEART RATE: 72 BPM

## 2017-12-29 DIAGNOSIS — Z30.2 ENCOUNTER FOR VASECTOMY: Primary | ICD-10-CM

## 2017-12-29 PROCEDURE — 55250 REMOVAL OF SPERM DUCT(S): CPT | Performed by: FAMILY MEDICINE

## 2017-12-29 PROCEDURE — 88302 TISSUE EXAM BY PATHOLOGIST: CPT | Performed by: FAMILY MEDICINE

## 2017-12-29 ASSESSMENT — PAIN SCALES - GENERAL: PAINLEVEL: NO PAIN (0)

## 2017-12-29 NOTE — MR AVS SNAPSHOT
"              After Visit Summary   12/29/2017    Mt Painting    MRN: [id number]           Patient Information     Date Of Birth          1980        Visit Information        Provider Department      12/29/2017 3:40 PM Francisco J Wilson MD [facility]        Today's Diagnoses     Encounter for vasectomy    -  1       Follow-ups after your visit        Future tests that were ordered for you today     Open Future Orders        Priority Expected Expires Ordered    Semen Analysis Post Vasectomy Routine  12/29/2018 12/29/2017    Semen Analysis Post Vasectomy Routine  12/29/2018 12/29/2017            Who to contact     If you have questions or need follow up information about today's clinic visit or your schedule please contact [facility] directly at [phone].  Normal or non-critical lab and imaging results will be communicated to you by MyChart, letter or phone within 4 business days after the clinic has received the results. If you do not hear from us within 7 days, please contact the clinic through MyChart or phone. If you have a critical or abnormal lab result, we will notify you by phone as soon as possible.  Submit refill requests through [employer] or call your pharmacy and they will forward the refill request to us. Please allow 3 business days for your refill to be completed.          Additional Information About Your Visit        MyChart Information     [employer] lets you send messages to your doctor, view your test results, renew your prescriptions, schedule appointments and more. To sign up, go to [email or web address] . Click on \"Log in\" on the left side of the screen, which will take you to the Welcome page. Then click on \"Sign up Now\" on the right side of the page.     You will be asked to enter the access code listed below, as well as some personal information. Please follow the directions to create your username and password.     Your access code is: " [email or web address]  Expires: 3/27/2018  5:16 PM     Your access code will  in 90 days. If you need help or a new code, please call your [facility] or [phone].        Care EveryWhere ID     This is your Care EveryWhere ID. This could be used by other organizations to access your [place] medical records  [id number]        Your Vitals Were     Pulse Temperature Respirations BMI (Body Mass Index)          72 98.8  F (37.1  C) (Tympanic) 20 38.25 kg/m2         Blood Pressure from Last 3 Encounters:   17 (!) 160/96   17 (!) 138/100   10/17/17 132/88    Weight from Last 3 Encounters:   17 282 lb (127.9 kg)   17 282 lb (127.9 kg)   10/17/17 294 lb (133.4 kg)              We Performed the Following     Surgical pathology exam     VASECTOMY UNILAT/BILAT W POSTOP SEMEN        Primary Care Provider Office Phone # Fax #    Vern Wheatley PA-C [phone] [address] [place] [address]        Equal Access to Services     DAMION EDWARDS : Hadii aad ku hadasho Soomaali, waaxda luqadaha, qaybta kaalmada adeegyada, waxay anain hayrodrigon hamlet sherman. So [facility] [phone].    ATENCIÓN: Si habla español, tiene a cramer disposición servicios gratuitos de asistencia lingüística. Llame al [phone].    We comply with applicable federal civil rights laws and [place] laws. We do not discriminate on the basis of race, color, national origin, age, disability, sex, sexual orientation, or gender identity.            Thank you!     Thank you for choosing [facility]  for your care. Our goal is always to provide you with excellent care. Hearing back from our patients is one way we can continue to improve our services. Please take a few minutes to complete the written survey that you may receive in the mail after your visit with us. Thank you!             Your Updated Medication List - Protect others around you: Learn how to safely use, store and throw away your  medicines at www.disposemymeds.org.          This list is accurate as of: 12/29/17  6:26 PM.  Always use your most recent med list.                   Brand Name Dispense Instructions for use Diagnosis    citalopram 20 MG tablet    celeXA    30 tablet    Take 1 tablet (20 mg) by mouth daily    Mild episode of recurrent major depressive disorder (H)       lisinopril-hydrochlorothiazide 10-12.5 MG per tablet    PRINZIDE/ZESTORETIC    90 tablet    Take 1 tablet by mouth daily    Benign essential hypertension       metFORMIN 500 MG 24 hr tablet    GLUCOPHAGE-XR    60 tablet    Take 1 tablet (500 mg) by mouth daily (with dinner)    Type 2 diabetes mellitus without complication, without long-term current use of insulin (H)       triamcinolone 0.1 % cream    KENALOG    80 g    Apply sparingly to affected area three times daily    Dermatitis

## 2017-12-29 NOTE — PROGRESS NOTES
SUBJECTIVE:   Mt Painting is a 37 year old male who presents to clinic today for the following health issues:    SUBJECTIVE: Mt Painting is a 37 year old male  who presents for a vasectomy.  He has previously been seen for a vasectomy consult on 12/27/2017. I had reviewed risks and benefits and alternatives at that time and given him printed information about the procedure.  He had no further questions today.  A signed consent form, signed by him has been completed.      Procedure note:  The patient was supine on procedure table.  Scrotum shaved and prepped with povidine-iodine solution. The scrotum was then sterily draped.  I started with the right side, localized the vas in the scrotum and then anesthetized the skin with 1% xylocaine without epinephrine.  A vertical incision 1 cm was made and the vas brought up out of the scrotum through the incision.  The vas tube was isolated from surrounding tissues and clamped on both ends removing a 1 cm portion of the vas. Both ends of the vas were tied with 3-0 silk suture.  Both ends were also cauterized.  The distal end of the vas was buried in the tunica with 3-0 chromic suture.  Any small bleeders were cauterized with hand held cautery.  One was sutured with chronic.  The incision was dry, the wound closed with 1 3-0 chromic skin sutures.   The identical procedure was done on the left side, removing a 1 cm segment of vas.  Complications:none  Post op care: He was given 2 sample containers to bring back semen specimens in 6-8 weeks.  I recommended two negative semen samples before stopping contraception. [place] may be resumed when comfortable.  Acetaminophen as needed for pain.   Ice could be used on scrotum for pain or swelling.  Take it easy tonight and avoid heavy lifting or straining in the next two days.  Recheck with problems.

## 2017-12-29 NOTE — NURSING NOTE
Chief Complaint   Patient presents with     Vasectomy       Initial BP (!) 160/96 (BP Location: Right arm, Patient Position: Chair, Cuff Size: Adult Large)  Pulse 72  Temp 98.8  F (37.1  C) (Tympanic)  Resp 20  Wt 282 lb (127.9 kg)  BMI 38.25 kg/m2 Estimated body mass index is 38.25 kg/(m^2) as calculated from the following:    Height as of 12/27/17: 6' (1.829 m).    Weight as of this encounter: 282 lb (127.9 kg).  Medication Reconciliation: complete    Health Maintenance that is potentially due pending provider review:  BP was high, used pink card, recheck manually    Pt will schedule diabetes appt.    Is there anyone who you would like to be able to receive your results? No  If yes have patient fill out ILEANA TuckerCMA

## 2018-01-03 LAB — COPATH REPORT: NORMAL

## 2018-05-29 ENCOUNTER — TELEPHONE (OUTPATIENT)
Dept: FAMILY MEDICINE | Facility: CLINIC | Age: 38
End: 2018-05-29

## 2018-05-29 NOTE — TELEPHONE ENCOUNTER
Reviewing charts. Patient is due for a diabetes check.    Called and left message for patient to call clinic back.    Daksha Encarnacion MA

## 2018-09-07 ENCOUNTER — ALLIED HEALTH/NURSE VISIT (OUTPATIENT)
Dept: FAMILY MEDICINE | Facility: CLINIC | Age: 38
End: 2018-09-07

## 2018-09-07 VITALS — DIASTOLIC BLOOD PRESSURE: 98 MMHG | SYSTOLIC BLOOD PRESSURE: 160 MMHG | HEART RATE: 88 BPM

## 2018-09-07 DIAGNOSIS — I10 BENIGN ESSENTIAL HYPERTENSION: Primary | ICD-10-CM

## 2018-09-07 DIAGNOSIS — E11.9 TYPE 2 DIABETES MELLITUS WITHOUT COMPLICATION, WITHOUT LONG-TERM CURRENT USE OF INSULIN (H): ICD-10-CM

## 2018-09-07 PROCEDURE — 99207 ZZC NO CHARGE NURSE ONLY: CPT

## 2018-09-07 RX ORDER — METFORMIN HCL 500 MG
500 TABLET, EXTENDED RELEASE 24 HR ORAL
Qty: 60 TABLET | Refills: 0 | Status: SHIPPED | OUTPATIENT
Start: 2018-09-07 | End: 2018-12-14

## 2018-09-07 RX ORDER — LISINOPRIL/HYDROCHLOROTHIAZIDE 10-12.5 MG
1 TABLET ORAL DAILY
Qty: 60 TABLET | Refills: 0 | Status: SHIPPED | OUTPATIENT
Start: 2018-09-07 | End: 2018-09-25

## 2018-09-07 NOTE — MR AVS SNAPSHOT
"              After Visit Summary   9/7/2018    [place]    MRN: [id number]           Patient Information     Date Of Birth          1980        Visit Information        Provider Department      9/7/2018 10:00 AM [place] CL RN [facility]        Today's Diagnoses     Benign essential hypertension    -  1    Type 2 diabetes mellitus without complication, without long-term current use of insulin (H)           Follow-ups after your visit        Your next 10 appointments already scheduled     Oct 12, 2018  9:00 AM CDT   SHORT with QUINN Allen CNP   [facility] ([facility])    [address]  [facility] [address]   [phone]              Who to contact     If you have questions or need follow up information about today's clinic visit or your schedule please contact [facility] directly at [phone].  Normal or non-critical lab and imaging results will be communicated to you by MyChart, letter or phone within 4 business days after the clinic has received the results. If you do not hear from us within 7 days, please contact the clinic through MyChart or phone. If you have a critical or abnormal lab result, we will notify you by phone as soon as possible.  Submit refill requests through [employer] or call your pharmacy and they will forward the refill request to us. Please allow 3 business days for your refill to be completed.          Additional Information About Your Visit        MyChart Information     [employer] lets you send messages to your doctor, view your test results, renew your prescriptions, schedule appointments and more. To sign up, go to [email or web address] . Click on \"Log in\" on the left side of the screen, which will take you to the Welcome page. Then click on \"Sign up Now\" on the right side of the page.     You will be asked to enter the access code listed below, as well as some personal information. " Please follow the directions to create your username and password.     Your access code is: [email or web address]  Expires: 2018 10:25 AM     Your access code will  in 90 days. If you need help or a new code, please call your [facility] clinic or [phone].        Care EveryWhere ID     This is your Care EveryWhere ID. This could be used by other organizations to access your [facility] medical records  [id number]        Your Vitals Were     Pulse                   88            Blood Pressure from Last 3 Encounters:   18 (!) 160/98   17 (!) 160/96   17 (!) 138/100    Weight from Last 3 Encounters:   17 282 lb (127.9 kg)   17 282 lb (127.9 kg)   10/17/17 294 lb (133.4 kg)              Today, you had the following     No orders found for display         Today's Medication Changes          These changes are accurate as of 18 10:25 AM.  If you have any questions, ask your nurse or doctor.               These medicines have changed or have updated prescriptions.        Dose/Directions    * lisinopril-hydrochlorothiazide 10-12.5 MG per tablet   Commonly known as:  PRINZIDE/ZESTORETIC   This may have changed:  Another medication with the same name was added. Make sure you understand how and when to take each.   Used for:  Benign essential hypertension        Dose:  1 tablet   Take 1 tablet by mouth daily   Quantity:  90 tablet   Refills:  3       * lisinopril-hydrochlorothiazide 10-12.5 MG per tablet   Commonly known as:  PRINZIDE/ZESTORETIC   This may have changed:  You were already taking a medication with the same name, and this prescription was added. Make sure you understand how and when to take each.   Used for:  Benign essential hypertension        Dose:  1 tablet   Take 1 tablet by mouth daily   Quantity:  60 tablet   Refills:  0       * metFORMIN 500 MG 24 hr tablet   Commonly known as:  GLUCOPHAGE-XR   This may have changed:  Another medication with the same name was added.  Make sure you understand how and when to take each.   Used for:  Type 2 diabetes mellitus without complication, without long-term current use of insulin (H)        Dose:  500 mg   Take 1 tablet (500 mg) by mouth daily (with dinner)   Quantity:  60 tablet   Refills:  1       * metFORMIN 500 MG 24 hr tablet   Commonly known as:  GLUCOPHAGE-XR   This may have changed:  You were already taking a medication with the same name, and this prescription was added. Make sure you understand how and when to take each.   Used for:  Type 2 diabetes mellitus without complication, without long-term current use of insulin (H)        Dose:  500 mg   Take 1 tablet (500 mg) by mouth daily (with dinner)   Quantity:  60 tablet   Refills:  0       * Notice:  This list has 4 medication(s) that are the same as other medications prescribed for you. Read the directions carefully, and ask your doctor or other care provider to review them with you.         Where to get your medicines      These medications were sent to [id number] [id number] [facility] [id number]    Hours:  Closed 10-16-08 business to [facility] Phone:  [phone]     lisinopril-hydrochlorothiazide 10-12.5 MG per tablet    metFORMIN 500 MG 24 hr tablet                Primary Care Provider Fax #    Physician No Ref-Primary [phone]       No address on file        Equal Access to Services     LAURA EDWARDS AH: Alfred Agarwal, warajeev norman, qaybta kaalmaana rosa porter, rosalinda sherman. So [facility] [phone].    ATENCIÓN: Si habla español, tiene a cramer disposición servicios gratuitos de asistencia lingüística. Llame al [phone].    We comply with applicable federal civil rights laws and [place] laws. We do not discriminate on the basis of race, color, national origin, age, disability, sex, sexual orientation, or gender identity.            Thank you!     Thank you for  choosing [facility]  for your care. Our goal is always to provide you with excellent care. Hearing back from our patients is one way we can continue to improve our services. Please take a few minutes to complete the written survey that you may receive in the mail after your visit with us. Thank you!             Your Updated Medication List - Protect others around you: Learn how to safely use, store and throw away your medicines at www.disposemymeds.org.          This list is accurate as of 9/7/18 10:25 AM.  Always use your most recent med list.                   Brand Name Dispense Instructions for use Diagnosis    citalopram 20 MG tablet    celeXA    30 tablet    Take 1 tablet (20 mg) by mouth daily    Mild episode of recurrent major depressive disorder (H)       * lisinopril-hydrochlorothiazide 10-12.5 MG per tablet    PRINZIDE/ZESTORETIC    90 tablet    Take 1 tablet by mouth daily    Benign essential hypertension       * lisinopril-hydrochlorothiazide 10-12.5 MG per tablet    PRINZIDE/ZESTORETIC    60 tablet    Take 1 tablet by mouth daily    Benign essential hypertension       * metFORMIN 500 MG 24 hr tablet    GLUCOPHAGE-XR    60 tablet    Take 1 tablet (500 mg) by mouth daily (with dinner)    Type 2 diabetes mellitus without complication, without long-term current use of insulin (H)       * metFORMIN 500 MG 24 hr tablet    GLUCOPHAGE-XR    60 tablet    Take 1 tablet (500 mg) by mouth daily (with dinner)    Type 2 diabetes mellitus without complication, without long-term current use of insulin (H)       triamcinolone 0.1 % cream    KENALOG    80 g    Apply sparingly to affected area three times daily    Dermatitis       * Notice:  This list has 4 medication(s) that are the same as other medications prescribed for you. Read the directions carefully, and ask your doctor or other care provider to review them with you.

## 2018-09-07 NOTE — PROGRESS NOTES
Vitals:    09/07/18 1020 09/07/18 1021   BP: (!) 168/100 (!) 160/98   BP Location: Right arm Right arm   Patient Position: Sitting Sitting   Cuff Size: Adult Large Adult Large   Pulse: 88        Pt has been off his B/P meds for several weeks.  Missed appt today with no appts open.  Pt leaving for [place] today for work x 1 month.  Pt usually sees  who has left .  Todays appt was to establish care.  Pt given refill to cover until he returns, appt scheduled for 10/12/18.  Discussed low salt diet.  Pt has no complaints or symptoms from his elevated B/P.  Sowmya

## 2018-09-25 ENCOUNTER — OFFICE VISIT (OUTPATIENT)
Dept: FAMILY MEDICINE | Facility: CLINIC | Age: 38
End: 2018-09-25
Payer: COMMERCIAL

## 2018-09-25 VITALS
RESPIRATION RATE: 18 BRPM | HEART RATE: 100 BPM | DIASTOLIC BLOOD PRESSURE: 86 MMHG | HEIGHT: 72 IN | SYSTOLIC BLOOD PRESSURE: 154 MMHG | WEIGHT: 268 LBS | BODY MASS INDEX: 36.3 KG/M2 | TEMPERATURE: 97 F

## 2018-09-25 DIAGNOSIS — E11.9 TYPE 2 DIABETES MELLITUS WITHOUT COMPLICATION, WITHOUT LONG-TERM CURRENT USE OF INSULIN (H): Primary | ICD-10-CM

## 2018-09-25 DIAGNOSIS — R74.8 ELEVATED LIVER ENZYMES: ICD-10-CM

## 2018-09-25 DIAGNOSIS — E66.01 MORBID OBESITY (H): ICD-10-CM

## 2018-09-25 DIAGNOSIS — E78.5 HYPERLIPIDEMIA WITH TARGET LDL LESS THAN 130: ICD-10-CM

## 2018-09-25 DIAGNOSIS — Z23 NEED FOR TDAP VACCINATION: ICD-10-CM

## 2018-09-25 LAB
ALBUMIN SERPL-MCNC: 3.9 G/DL (ref 3.4–5)
ALP SERPL-CCNC: 76 U/L (ref 40–150)
ALT SERPL W P-5'-P-CCNC: 101 U/L (ref 0–70)
ANION GAP SERPL CALCULATED.3IONS-SCNC: 7 MMOL/L (ref 3–14)
AST SERPL W P-5'-P-CCNC: 136 U/L (ref 0–45)
BILIRUB SERPL-MCNC: 1.7 MG/DL (ref 0.2–1.3)
BUN SERPL-MCNC: 16 MG/DL (ref 7–30)
CALCIUM SERPL-MCNC: 9.3 MG/DL (ref 8.5–10.1)
CHLORIDE SERPL-SCNC: 95 MMOL/L (ref 94–109)
CHOLEST SERPL-MCNC: 240 MG/DL
CO2 SERPL-SCNC: 30 MMOL/L (ref 20–32)
CREAT SERPL-MCNC: 0.82 MG/DL (ref 0.66–1.25)
CREAT UR-MCNC: 239 MG/DL
GFR SERPL CREATININE-BSD FRML MDRD: >90 ML/MIN/1.7M2
GLUCOSE SERPL-MCNC: 295 MG/DL (ref 70–99)
HBA1C MFR BLD: 7.9 % (ref 0–5.6)
HDLC SERPL-MCNC: 44 MG/DL
LDLC SERPL CALC-MCNC: 143 MG/DL
MICROALBUMIN UR-MCNC: 18 MG/L
MICROALBUMIN/CREAT UR: 7.41 MG/G CR (ref 0–17)
NONHDLC SERPL-MCNC: 196 MG/DL
POTASSIUM SERPL-SCNC: 4.2 MMOL/L (ref 3.4–5.3)
PROT SERPL-MCNC: 8.6 G/DL (ref 6.8–8.8)
SODIUM SERPL-SCNC: 132 MMOL/L (ref 133–144)
TRIGL SERPL-MCNC: 267 MG/DL

## 2018-09-25 PROCEDURE — 82043 UR ALBUMIN QUANTITATIVE: CPT | Performed by: NURSE PRACTITIONER

## 2018-09-25 PROCEDURE — 99214 OFFICE O/P EST MOD 30 MIN: CPT | Mod: 25 | Performed by: NURSE PRACTITIONER

## 2018-09-25 PROCEDURE — 80061 LIPID PANEL: CPT | Performed by: NURSE PRACTITIONER

## 2018-09-25 PROCEDURE — 83036 HEMOGLOBIN GLYCOSYLATED A1C: CPT | Performed by: NURSE PRACTITIONER

## 2018-09-25 PROCEDURE — 80053 COMPREHEN METABOLIC PANEL: CPT | Performed by: NURSE PRACTITIONER

## 2018-09-25 PROCEDURE — 36415 COLL VENOUS BLD VENIPUNCTURE: CPT | Performed by: NURSE PRACTITIONER

## 2018-09-25 PROCEDURE — 90715 TDAP VACCINE 7 YRS/> IM: CPT | Performed by: NURSE PRACTITIONER

## 2018-09-25 PROCEDURE — 90471 IMMUNIZATION ADMIN: CPT | Performed by: NURSE PRACTITIONER

## 2018-09-25 ASSESSMENT — ANXIETY QUESTIONNAIRES
2. NOT BEING ABLE TO STOP OR CONTROL WORRYING: SEVERAL DAYS
GAD7 TOTAL SCORE: 6
6. BECOMING EASILY ANNOYED OR IRRITABLE: SEVERAL DAYS
5. BEING SO RESTLESS THAT IT IS HARD TO SIT STILL: SEVERAL DAYS
7. FEELING AFRAID AS IF SOMETHING AWFUL MIGHT HAPPEN: NOT AT ALL
4. TROUBLE RELAXING: SEVERAL DAYS
7. FEELING AFRAID AS IF SOMETHING AWFUL MIGHT HAPPEN: NOT AT ALL
GAD7 TOTAL SCORE: 6
1. FEELING NERVOUS, ANXIOUS, OR ON EDGE: SEVERAL DAYS
GAD7 TOTAL SCORE: 6
3. WORRYING TOO MUCH ABOUT DIFFERENT THINGS: SEVERAL DAYS

## 2018-09-25 ASSESSMENT — PATIENT HEALTH QUESTIONNAIRE - PHQ9
SUM OF ALL RESPONSES TO PHQ QUESTIONS 1-9: 9
SUM OF ALL RESPONSES TO PHQ QUESTIONS 1-9: 9
10. IF YOU CHECKED OFF ANY PROBLEMS, HOW DIFFICULT HAVE THESE PROBLEMS MADE IT FOR YOU TO DO YOUR WORK, TAKE CARE OF THINGS AT HOME, OR GET ALONG WITH OTHER PEOPLE: SOMEWHAT DIFFICULT

## 2018-09-25 NOTE — PATIENT INSTRUCTIONS
Follow-up with diabetic education     Do You Have Diabetes?    Diabetes is a condition in which your body has trouble using a sugar called glucose for energy. As a result, the sugar level in your blood becomes too high. Diabetes is a chronic (lifelong) condition. Left untreated, it can result in major health problems (complications) or serious life-threatening conditions such as ketoacidosis.   Signs of diabetes  Do any of the following questions apply to you? If so, see your healthcare provider.     Do you feel tired all the time?    Do you urinate often?    Do you feel thirsty or hungry all the time?    Are you losing weight for no reason?    Do cuts and bruises heal slowly?    Do you have numbness or tingling in your fingers or toes?    Do you have blurry vision?   What puts you at risk?  People of all backgrounds can get diabetes. More often, though, it affects  Americans, Native Americans, Hispanics,  Americans, and Pacific Islanders. Other factors that increase risk include:    A family history of diabetes    Being overweight    Being over age 40    Having had gestational diabetes (diabetes during pregnancy)    Not enough physical activity    If you take certain medicines   Why worry about diabetes?  Reasons include the following:     Diabetes keeps your body from turning food into energy.    Diabetes can cause problems with your eyes, kidneys, nerves, and feet. It can also hurt your heart and blood vessels.    Once you get diabetes, it won t go away.  See your healthcare provider for a checkup if you have any of the signs or risks listed above.   Date Last Reviewed: 6/1/2016 2000-2017 [employer]. [address] [place] [address], [place] [address]. All rights reserved. This information is not intended as a substitute for professional medical care. Always follow your healthcare professional's instructions.        Healthy Meals for Diabetes     A healthcare provider will help you develop a  meal plan that fits your needs.   Ask your healthcare team to help you make a meal plan that fits your needs. Your meal plan tells you when to eat your meals and snacks, what kinds of foods to eat, and how much of each food to eat. You don t have to give up all the foods you like. But you do need to follow some guidelines.  Choose healthy carbohydrates  Starches, sugars, and fiber are all types of carbohydrates. Fiber can help lower your cholesterol and triglycerides. Fiber is also healthy for your heart. You should have 20 to 35 grams of total fiber each day. Fiber-rich foods include:    Whole-grain breads and cereals    Bulgur wheat    Brown rice       Whole-wheat pasta    Fruits and vegetables    Dry beans, and peas   Keep track of the amount of carbohydrates you eat. This can help you keep the right balance of physical activity and medicine. The amount of carbohydrates needed will vary for each person. It depends on many things such as your health, the medicines you take, and how active you are. Your healthcare team will help you figure out the right amount of carbohydrates for you. You may start with around 45 to 60 grams of carbohydrates per meal, depending on your situation.   Here are some examples of foods containing about 15 grams of carbohydrates (1 serving of carbohydrates):    1/2 cup of canned or frozen fruit    A small piece of fresh fruit (4 ounces)    1 slice of bread    1/2 cup of oatmeal    1/3 cup of rice    4 to 6 crackers    1/2 English muffin    1/2 cup of black beans    1/4 of a large baked potato (3 ounces)    2/3 cup of plain fat-free yogurt    1 cup of soup    1/2 cup of casserole    6 chicken nuggets    2-inch-square brownie or cake without frosting    2 small cookies    1/2 cup of ice cream or sherbet  Choose healthy protein foods  Eating protein that is low in fat can help you control your weight. It also helps keep your heart healthy. Low-fat protein foods include:    Fish    Plant  proteins, such as dry beans and peas, nuts, and soy products like tofu and soymilk    Lean meat with all visible fat removed    Poultry with the skin removed    Low-fat or nonfat milk, cheese, and yogurt  Limit unhealthy fats and sugar  Saturated and trans fats are unhealthy for your heart. They raise LDL (bad) cholesterol. Fat is also high in calories, so it can make you gain weight. To cut down on unhealthy fats and sugar, limit these foods:    Butter or margarine    Palm and palm kernel oils and coconut oil    Cream    Cheese    [place]    Lunch meats       Ice cream    Sweet bakery goods such as pies, muffins, and donuts    Jams and jellies    Candy bars    Regular sodas   How much to eat  The amount of food you eat affects your blood sugar. It also affects your weight. Your healthcare team will tell you how much of each type of food you should eat.    Use measuring cups and spoons and a food scale to measure serving sizes.    Learn what a correct serving size looks like on your plate. This will help when you are away from home and can t measure your servings.    Eat only the number of servings given on your meal plan for each food. Don t take seconds.    Learn to read food labels. Be sure to look at serving size, total carbohydrates, fiber, calories, sugar, and saturated and trans fats. Look for healthier alternatives to foods that have added sugar.    Plan ahead for parties so you can still have a good time without going overboard with unhealthy food choices. Set a good example yourself by bringing a healthy dish to pot lucks.   Choose healthy snacks  When it comes to snacks, we usually think about foods with added sugar and fats. But there are many other options for healthier snack choices. Here are a few snack ideas to choose from:  Snacks with less than 5 grams of carbohydrates    1 piece of string cheese    3 celery sticks plus 1 tablespoon of peanut butter    5 cherry tomatoes plus 1 tablespoon of ranch  dressing    1 hard-boiled egg    1/4 cup of fresh blueberries     5 baby carrots    1 cup of light popcorn    1/2 cup of sugar-free gelatin    15 almonds  Snacks with about 10 to 20 grams of carbohydrates    1/3 cup of hummus plus 1 cup of fresh cut nonstarchy vegetables (carrots, green peppers, broccoli, celery, or a combination)    1/2 cup of fresh or canned fruit plus 1/4 cup of cottage cheese    1/2 cup of tuna salad with 4 crackers    2 rice cakes and a tablespoon of peanut butter    1 small apple or orange    3 cups light popcorn    1/2 of a turkey sandwich (1 slice of whole-wheat bread, 2 ounces of turkey, and mustard)  Portion sizes are important to controlling your blood sugar and staying at a healthy weight. Stock up on healthy snack items so you always have them on hand.  When to eat  Your meal plan will likely include breakfast, lunch, dinner, and some snacks.    Try to eat your meals and snacks at about the same times each day.    Eat all your meals and snacks. Skipping a meal or snack can make your blood sugar drop too low. It can also cause you to eat too much at the next meal or snack. Then your blood sugar could get too high.  Date Last Reviewed: 7/1/2016 2000-2017 [employer]. [address] [place] [address], [place] [address]. All rights reserved. This information is not intended as a substitute for professional medical care. Always follow your healthcare professional's instructions.        Diabetes: Meal Planning    You can help keep your blood sugar level in your target range by eating healthy foods. Your healthcare team can help you create a low-fat, nutritious meal plan. Take an active role in your diabetes management by following your meal plan and working with your healthcare team.  Make your meal plan  A meal plan gives guidelines for the types and amounts of food you should eat. The goal is to balance food and insulin (or other diabetes medications) so your blood sugars will be in  your target range. Your dietitian will help you make a flexible meal plan that includes many foods that you like.  Watch serving sizes  Your meal plan will group foods by servings. To learn how much a serving is, start by measuring food portions at each meal. Soon you ll know what a serving looks like on your plate. Ask your healthcare provider about how to balance servings of different foods.  Eat from all the food groups  The basis of a healthy meal plan is variety (eating lots of different foods). Choose lean meats, fresh fruits and vegetables, whole grains, and low-fat or nonfat dairy products. Eating a wide variety of foods provides the nutrients your body needs. It can also keep you from getting bored with your meal plan.  Learn about carbohydrates, fats, and protein    Carbohydrates are starches, sugars, and fiber. They are found in many foods, including fruit, bread, pasta, milk, and sweets. Of all the foods you eat, carbohydrates have the most effect on your blood sugar. Your dietitian may teach you about carb counting, a way to figure out the number of carbohydrates in a meal.    Fats have the most calories. They also have the most effect on your weight and your risk of heart disease. When you have diabetes, it s important to control your weight and protect your heart. Foods that are high in fat include whole milk, cheese, snack foods, and desserts.    Protein is important for building and repairing muscles and bones. Choose low-fat protein sources, such as fish, egg whites, and skinless chicken.  Reduce liquid sugars  Extra calories from sodas, sports drinks, and fruit drinks make it hard to keep blood sugar in range. Cut as many liquid sugars from your meal plan as you can.  This includes most fruit juices, which are often high in natural or added sugar. Instead, drink plenty of water and other sugar-free beverages.  Eat less fat  If you need to lose weight, try to reduce the amount of fat in your diet.  This can also help lower your cholesterol level to keep blood vessels healthier. Cut fat by using only small amounts of liquid oil for cooking. Read food labels carefully to avoid foods with unhealthy trans fats.  Timing your meals  When it comes to blood sugar control, when you eat is as important as what you eat. You may need to eat several small meals spaced evenly throughout the day to stay in your target range. So don t skip breakfast or wait until late in the day to get most of your calories. Doing so can cause your blood sugar to rise too high or fall too low.   Date Last Reviewed: 3/1/2016    4685-2131 [employer]. [address] [place] [address]. All rights reserved. This information is not intended as a substitute for professional medical care. Always follow your healthcare professional's instructions.        Diabetes: Ways to Take Medicine  There are many kinds of diabetes medicines. Some medicines can be swallowed. Others have to be injected. Otherwise, they would be broken down in the stomach before reaching the bloodstream. Some medicines, such as insulin, can be taken in more than one way. The main ways of taking medicine for diabetes are shown below.    Oral medicines (pills)        Injections (shots given using a syringe or pen-like device)        Insulin pumps (devices that can deliver a steady amount of insulin 24 hours a day)        Inhalation devices (devices that deliver insulin by inhaling it)       Date Last Reviewed: 10/1/2016    4271-2480 [employer]. [address] [place] [address]. All rights reserved. This information is not intended as a substitute for professional medical care. Always follow your healthcare professional's instructions.        Oral Medicines for Type 2 Diabetes  Diabetes pills can help to manage your blood sugar. These pills are not insulin. They work to manage your blood sugar in several ways. You may be given a  combination of medicines. Always follow your healthcare provider's instructions.  Some pills may put you at higher risk for low blood sugar (hypoglycemia). Watch for symptoms of low blood sugar. Symptoms are listed below. Call your healthcare provider if low blood sugar happens often.  Type of diabetes pills  Biguanides  These pills help control the amount of glucose in your blood. They do this by decreasing the amount of glucose made by your liver and helping your muscles use insulin more effectively. These medicines are usually taken with or after each meal. Possible side effects and other problems include:    Diarrhea    Nausea    Vomiting    Belly (abdominal) bloating    Excess gas (flatulence)    Metallic taste in mouth    Lower blood vitamin B12 levels from decreased absorption from the GI tract of this essential vitamin  Have your vitamin B12 levels checked regularly if you have used Biguanides for a long time. This is especially important if you have anemia or peripheral neuropathy.  Sulfonylureas  These pills help your body make more insulin. They are usually taken 30 minutes before a meal. Possible side effects include hypoglycemia.  Alpha-glucosidase inhibitors  These pills slow the digestion of sugars and starches. They can help keep your blood sugar from going too high after a meal. Take them with the first bite of each main meal. Possible side effects include:    Stomach pain    Diarrhea    Excess gas (flatulence)    Thiazolidinediones  These pills help your muscle cells use insulin better. Your healthcare provider may order lab tests to check the function of your liver before prescribing these pills and regularly while you are taking them. Possible side effects include:     Weight gain    Extra fluid in your body and swelling    Increased risk for heart failure    Osteoporosis and increased risk for broken bones  Meglitinides  These pills increase your insulin for a short period of time. They are  usually taken before meals. Possible side effects include:    Low blood sugar    Diarrhea    Headache     Slightly increased risk for heart problems  DPP-4 inhibitors  These pills help lower blood sugar levels in people with type 2 diabetes. They are less likely to cause hypoglycemia, unless you take them with a sulfonylurea. They are taken once a day. Possible side effects include:    Upper respiratory tract infection    Stuffy or runny nose    Sore throat    Headache  Other side effects are under investigation.  SGLT-2 inhibitors  These pills help lower blood sugar levels in people with type 2 diabetes by increasing the amount of sugar that leaks into the urine. Possible side effects include:    Urinary tract infections    Genital infections, especially in women    Dehydration and low blood pressure    Increased bone fractures    Development of keotacidosis while blood sugar is only mildly raised above the target range  Note: The FDA has issued a safety warning for the SGLT-2 inhibitor canagliflozin. Recent studies have shown that this medicine increases the risk of leg and foot amputations. If you are taking this medicine, tell your healthcare provider right away if you have any new pain or tenderness, sores, or infections in your legs or feet. Talk with your healthcare provider before stopping any diabetes medicine.   Dopamine D2 receptor agonist (bromocriptine mesylate)  These pills help lower blood sugar levels in people with type 2 diabetes. Possible side effects of this medicine include:    Nausea    Vomiting    Fatigue    Dizziness    Headaches  Combination pills  These medicines may help keep your blood glucose within your target range. They also help your pancreas make more insulin and may help your muscles use insulin more effectively. Side effects depend on which type of combination you use. Your healthcare provider can tell you more.     Watch for symptoms of hypoglycemia  Symptoms include the  following:    Headaches    Shakiness or dizziness    Hunger    Cold, clammy skin; sweating    A hard, fast heartbeat    Confusion or irritability   Date Last Reviewed: 6/1/2016 2000-2017 [employer]. [address] [place] [address], [place] [address]. All rights reserved. This information is not intended as a substitute for professional medical care. Always follow your healthcare professional's instructions.

## 2018-09-25 NOTE — NURSING NOTE
Prior to injection verified patient identity using patient's name and date of birth.  Due to injection administration, patient instructed to remain in clinic for 15 minutes  afterwards, and to report any adverse reaction to me immediately.    Screening Questionnaire for Adult Immunization    Are you sick today?   No   Do you have allergies to medications, food, a vaccine component or latex?   No   Have you ever had a serious reaction after receiving a vaccination?   No   Do you have a long-term health problem with heart disease, lung disease, asthma, kidney disease, metabolic disease (e.g. diabetes), anemia, or other blood disorder?   No   Do you have cancer, leukemia, HIV/AIDS, or any other immune system problem?   No   In the past 3 months, have you taken medications that affect  your immune system, such as prednisone, other steroids, or anticancer drugs; drugs for the treatment of rheumatoid arthritis, Crohn s disease, or psoriasis; or have you had radiation treatments?   No   Have you had a seizure, or a brain or other nervous system problem?   No   During the past year, have you received a transfusion of blood or blood     products, or been given immune (gamma) globulin or antiviral drug?   No   For women: Are you pregnant or is there a chance you could become        pregnant during the next month?   No   Have you received any vaccinations in the past 4 weeks?   No     Immunization questionnaire answers were all negative.        Per orders of GLORIA Kendrick, injection of Tdap given by Daksha Encarnacion. Patient instructed to remain in clinic for 15 minutes afterwards, and to report any adverse reaction to me immediately.       Screening performed by Daksha Encarnacion on 9/25/2018 at 8:53 AM.

## 2018-09-25 NOTE — MR AVS SNAPSHOT
After Visit Summary   9/25/2018    [place]    MRN: [id number]           Patient Information     Date Of Birth          1980        Visit Information        Provider Department      9/25/2018 8:40 AM Latosha Kednrick APRN [facility]        Today's Diagnoses     Need for Tdap vaccination    -  1    Type 2 diabetes mellitus without complication, without long-term current use of insulin (H)        Morbid obesity (H)          Care Instructions    Follow-up with diabetic education     Do You Have Diabetes?    Diabetes is a condition in which your body has trouble using a sugar called glucose for energy. As a result, the sugar level in your blood becomes too high. Diabetes is a chronic (lifelong) condition. Left untreated, it can result in major health problems (complications) or serious life-threatening conditions such as ketoacidosis.   Signs of diabetes  Do any of the following questions apply to you? If so, see your healthcare provider.     Do you feel tired all the time?    Do you urinate often?    Do you feel thirsty or hungry all the time?    Are you losing weight for no reason?    Do cuts and bruises heal slowly?    Do you have numbness or tingling in your fingers or toes?    Do you have blurry vision?   What puts you at risk?  People of all backgrounds can get diabetes. More often, though, it affects  Americans, Native Americans, Hispanics,  Americans, and Pacific Islanders. Other factors that increase risk include:    A family history of diabetes    Being overweight    Being over age 40    Having had gestational diabetes (diabetes during pregnancy)    Not enough physical activity    If you take certain medicines   Why worry about diabetes?  Reasons include the following:     Diabetes keeps your body from turning food into energy.    Diabetes can cause problems with your eyes, kidneys, nerves, and feet. It can also hurt your heart and blood  vessels.    Once you get diabetes, it won t go away.  See your healthcare provider for a checkup if you have any of the signs or risks listed above.   Date Last Reviewed: 6/1/2016 2000-2017 [employer]. [address] [place] [address], [place] [address]. All rights reserved. This information is not intended as a substitute for professional medical care. Always follow your healthcare professional's instructions.        Healthy Meals for Diabetes     A healthcare provider will help you develop a meal plan that fits your needs.   Ask your healthcare team to help you make a meal plan that fits your needs. Your meal plan tells you when to eat your meals and snacks, what kinds of foods to eat, and how much of each food to eat. You don t have to give up all the foods you like. But you do need to follow some guidelines.  Choose healthy carbohydrates  Starches, sugars, and fiber are all types of carbohydrates. Fiber can help lower your cholesterol and triglycerides. Fiber is also healthy for your heart. You should have 20 to 35 grams of total fiber each day. Fiber-rich foods include:    Whole-grain breads and cereals    Bulgur wheat    Brown rice       Whole-wheat pasta    Fruits and vegetables    Dry beans, and peas   Keep track of the amount of carbohydrates you eat. This can help you keep the right balance of physical activity and medicine. The amount of carbohydrates needed will vary for each person. It depends on many things such as your health, the medicines you take, and how active you are. Your healthcare team will help you figure out the right amount of carbohydrates for you. You may start with around 45 to 60 grams of carbohydrates per meal, depending on your situation.   Here are some examples of foods containing about 15 grams of carbohydrates (1 serving of carbohydrates):    1/2 cup of canned or frozen fruit    A small piece of fresh fruit (4 ounces)    1 slice of bread    1/2 cup of oatmeal    1/3 cup  of rice    4 to 6 crackers    1/2 English muffin    1/2 cup of black beans    1/4 of a large baked potato (3 ounces)    2/3 cup of plain fat-free yogurt    1 cup of soup    1/2 cup of casserole    6 chicken nuggets    2-inch-square brownie or cake without frosting    2 small cookies    1/2 cup of ice cream or sherbet  Choose healthy protein foods  Eating protein that is low in fat can help you control your weight. It also helps keep your heart healthy. Low-fat protein foods include:    Fish    Plant proteins, such as dry beans and peas, nuts, and soy products like tofu and soymilk    Lean meat with all visible fat removed    Poultry with the skin removed    Low-fat or nonfat milk, cheese, and yogurt  Limit unhealthy fats and sugar  Saturated and trans fats are unhealthy for your heart. They raise LDL (bad) cholesterol. Fat is also high in calories, so it can make you gain weight. To cut down on unhealthy fats and sugar, limit these foods:    Butter or margarine    Palm and palm kernel oils and coconut oil    Cream    Cheese    [place]    Lunch meats       Ice cream    Sweet bakery goods such as pies, muffins, and donuts    Jams and jellies    Candy bars    Regular sodas   How much to eat  The amount of food you eat affects your blood sugar. It also affects your weight. Your healthcare team will tell you how much of each type of food you should eat.    Use measuring cups and spoons and a food scale to measure serving sizes.    Learn what a correct serving size looks like on your plate. This will help when you are away from home and can t measure your servings.    Eat only the number of servings given on your meal plan for each food. Don t take seconds.    Learn to read food labels. Be sure to look at serving size, total carbohydrates, fiber, calories, sugar, and saturated and trans fats. Look for healthier alternatives to foods that have added sugar.    Plan ahead for parties so you can still have a good time without  going overboard with unhealthy food choices. Set a good example yourself by bringing a healthy dish to pot lucks.   Choose healthy snacks  When it comes to snacks, we usually think about foods with added sugar and fats. But there are many other options for healthier snack choices. Here are a few snack ideas to choose from:  Snacks with less than 5 grams of carbohydrates    1 piece of string cheese    3 celery sticks plus 1 tablespoon of peanut butter    5 cherry tomatoes plus 1 tablespoon of ranch dressing    1 hard-boiled egg    1/4 cup of fresh blueberries     5 baby carrots    1 cup of light popcorn    1/2 cup of sugar-free gelatin    15 almonds  Snacks with about 10 to 20 grams of carbohydrates    1/3 cup of hummus plus 1 cup of fresh cut nonstarchy vegetables (carrots, green peppers, broccoli, celery, or a combination)    1/2 cup of fresh or canned fruit plus 1/4 cup of cottage cheese    1/2 cup of tuna salad with 4 crackers    2 rice cakes and a tablespoon of peanut butter    1 small apple or orange    3 cups light popcorn    1/2 of a turkey sandwich (1 slice of whole-wheat bread, 2 ounces of turkey, and mustard)  Portion sizes are important to controlling your blood sugar and staying at a healthy weight. Stock up on healthy snack items so you always have them on hand.  When to eat  Your meal plan will likely include breakfast, lunch, dinner, and some snacks.    Try to eat your meals and snacks at about the same times each day.    Eat all your meals and snacks. Skipping a meal or snack can make your blood sugar drop too low. It can also cause you to eat too much at the next meal or snack. Then your blood sugar could get too high.  Date Last Reviewed: 7/1/2016 2000-2017 [employer]. [address] [facility], [place] [address]. All rights reserved. This information is not intended as a substitute for professional medical care. Always follow your healthcare professional's  instructions.        Diabetes: Meal Planning    You can help keep your blood sugar level in your target range by eating healthy foods. Your healthcare team can help you create a low-fat, nutritious meal plan. Take an active role in your diabetes management by following your meal plan and working with your healthcare team.  Make your meal plan  A meal plan gives guidelines for the types and amounts of food you should eat. The goal is to balance food and insulin (or other diabetes medications) so your blood sugars will be in your target range. Your dietitian will help you make a flexible meal plan that includes many foods that you like.  Watch serving sizes  Your meal plan will group foods by servings. To learn how much a serving is, start by measuring food portions at each meal. Soon you ll know what a serving looks like on your plate. Ask your healthcare provider about how to balance servings of different foods.  Eat from all the food groups  The basis of a healthy meal plan is variety (eating lots of different foods). Choose lean meats, fresh fruits and vegetables, whole grains, and low-fat or nonfat dairy products. Eating a wide variety of foods provides the nutrients your body needs. It can also keep you from getting bored with your meal plan.  Learn about carbohydrates, fats, and protein    Carbohydrates are starches, sugars, and fiber. They are found in many foods, including fruit, bread, pasta, milk, and sweets. Of all the foods you eat, carbohydrates have the most effect on your blood sugar. Your dietitian may teach you about carb counting, a way to figure out the number of carbohydrates in a meal.    Fats have the most calories. They also have the most effect on your weight and your risk of heart disease. When you have diabetes, it s important to control your weight and protect your heart. Foods that are high in fat include whole milk, cheese, snack foods, and desserts.    Protein is important for building  and repairing muscles and bones. Choose low-fat protein sources, such as fish, egg whites, and skinless chicken.  Reduce liquid sugars  Extra calories from sodas, sports drinks, and fruit drinks make it hard to keep blood sugar in range. Cut as many liquid sugars from your meal plan as you can.  This includes most fruit juices, which are often high in natural or added sugar. Instead, drink plenty of water and other sugar-free beverages.  Eat less fat  If you need to lose weight, try to reduce the amount of fat in your diet. This can also help lower your cholesterol level to keep blood vessels healthier. Cut fat by using only small amounts of liquid oil for cooking. Read food labels carefully to avoid foods with unhealthy trans fats.  Timing your meals  When it comes to blood sugar control, when you eat is as important as what you eat. You may need to eat several small meals spaced evenly throughout the day to stay in your target range. So don t skip breakfast or wait until late in the day to get most of your calories. Doing so can cause your blood sugar to rise too high or fall too low.   Date Last Reviewed: 3/1/2016    7920-0183 [employer]. [address] [place] [address]. All rights reserved. This information is not intended as a substitute for professional medical care. Always follow your healthcare professional's instructions.        Diabetes: Ways to Take Medicine  There are many kinds of diabetes medicines. Some medicines can be swallowed. Others have to be injected. Otherwise, they would be broken down in the stomach before reaching the bloodstream. Some medicines, such as insulin, can be taken in more than one way. The main ways of taking medicine for diabetes are shown below.    Oral medicines (pills)        Injections (shots given using a syringe or pen-like device)        Insulin pumps (devices that can deliver a steady amount of insulin 24 hours a day)        Inhalation devices  (devices that deliver insulin by inhaling it)       Date Last Reviewed: 10/1/2016    5095-9294 [employer]. [address] [place] [address], [place] [address]. All rights reserved. This information is not intended as a substitute for professional medical care. Always follow your healthcare professional's instructions.        Oral Medicines for Type 2 Diabetes  Diabetes pills can help to manage your blood sugar. These pills are not insulin. They work to manage your blood sugar in several ways. You may be given a combination of medicines. Always follow your healthcare provider's instructions.  Some pills may put you at higher risk for low blood sugar (hypoglycemia). Watch for symptoms of low blood sugar. Symptoms are listed below. Call your healthcare provider if low blood sugar happens often.  Type of diabetes pills  Biguanides  These pills help control the amount of glucose in your blood. They do this by decreasing the amount of glucose made by your liver and helping your muscles use insulin more effectively. These medicines are usually taken with or after each meal. Possible side effects and other problems include:    Diarrhea    Nausea    Vomiting    Belly (abdominal) bloating    Excess gas (flatulence)    Metallic taste in mouth    Lower blood vitamin B12 levels from decreased absorption from the GI tract of this essential vitamin  Have your vitamin B12 levels checked regularly if you have used Biguanides for a long time. This is especially important if you have anemia or peripheral neuropathy.  Sulfonylureas  These pills help your body make more insulin. They are usually taken 30 minutes before a meal. Possible side effects include hypoglycemia.  Alpha-glucosidase inhibitors  These pills slow the digestion of sugars and starches. They can help keep your blood sugar from going too high after a meal. Take them with the first bite of each main meal. Possible side effects include:    Stomach  pain    Diarrhea    Excess gas (flatulence)    Thiazolidinediones  These pills help your muscle cells use insulin better. Your healthcare provider may order lab tests to check the function of your liver before prescribing these pills and regularly while you are taking them. Possible side effects include:     Weight gain    Extra fluid in your body and swelling    Increased risk for heart failure    Osteoporosis and increased risk for broken bones  Meglitinides  These pills increase your insulin for a short period of time. They are usually taken before meals. Possible side effects include:    Low blood sugar    Diarrhea    Headache     Slightly increased risk for heart problems  DPP-4 inhibitors  These pills help lower blood sugar levels in people with type 2 diabetes. They are less likely to cause hypoglycemia, unless you take them with a sulfonylurea. They are taken once a day. Possible side effects include:    Upper respiratory tract infection    Stuffy or runny nose    Sore throat    Headache  Other side effects are under investigation.  SGLT-2 inhibitors  These pills help lower blood sugar levels in people with type 2 diabetes by increasing the amount of sugar that leaks into the urine. Possible side effects include:    Urinary tract infections    Genital infections, especially in women    Dehydration and low blood pressure    Increased bone fractures    Development of keotacidosis while blood sugar is only mildly raised above the target range  Note: The FDA has issued a safety warning for the SGLT-2 inhibitor canagliflozin. Recent studies have shown that this medicine increases the risk of leg and foot amputations. If you are taking this medicine, tell your healthcare provider right away if you have any new pain or tenderness, sores, or infections in your legs or feet. Talk with your healthcare provider before stopping any diabetes medicine.   Dopamine D2 receptor agonist (bromocriptine mesylate)  These pills  help lower blood sugar levels in people with type 2 diabetes. Possible side effects of this medicine include:    Nausea    Vomiting    Fatigue    Dizziness    Headaches  Combination pills  These medicines may help keep your blood glucose within your target range. They also help your pancreas make more insulin and may help your muscles use insulin more effectively. Side effects depend on which type of combination you use. Your healthcare provider can tell you more.     Watch for symptoms of hypoglycemia  Symptoms include the following:    Headaches    Shakiness or dizziness    Hunger    Cold, clammy skin; sweating    A hard, fast heartbeat    Confusion or irritability   Date Last Reviewed: 6/1/2016 2000-2017 [employer]. [address] [place] [address]. All rights reserved. This information is not intended as a substitute for professional medical care. Always follow your healthcare professional's instructions.                Follow-ups after your visit        Additional Services     DIABETES EDUCATOR REFERRAL       DIABETES SELF MANAGEMENT TRAINING (DSMT)      Your provider has referred you to Diabetes Education: FMG: Diabetes Education - [facility] [phone]   [email or web address]     If an urgent visit is needed or A1C is above 12, Care Team to call the Diabetes  Education Team at [phone] or send an In Basket message to the Diabetes Education Pool (P DIAB ED-PATIENT CARE).    A  will call you to make your appointment. If it has been more than 3 business days since your referral was placed, please call the above phone number to schedule.    Type of training and number of hours: New Diagnosis: Initial group DSMT - 10 hours.      Diabetes Type: Type 2 - On Oral Medication   Medicare covers: 10 hours of initial DSMT in 12 month period from the time of first visit, plus 2 hours of follow-up DSMT annually, and  additional hours as requested for insulin training.         Diabetes Co-Morbidities: hypertension               A1C Goal:  <7.0       A1C is: Lab Results       Component                Value               Date                       A1C                      7.9                 09/25/2018              MEDICAL NUTRITION THERAPY (MNT) for Diabetes    Medical Nutrition Therapy with a Registered Dietitian can be provided in coordination with Diabetes Self-Management Training to assist in achieving optimal diabetes management.    MNT Type and Hours: New diagnosis: Initial MNT - 3 hours                       Medicare will cover: 3 hours initial MNT in 12 month period after first visit, plus 2 hours of follow-up MNT annually        Diabetes Education Topics: Comprehensive Knowledge Assessment and Instruction, Knowledge: Healthy Eating, Being Active, Monitoring Blood Sugar and Taking Medication, Blood glucose meter instruction  and Medication Start: Oral Medication: Type/Dose/Timing:     Special Educational Needs Requiring Individual DSMT: None      Please be aware that coverage of these services is subject to the terms and limitations of your health insurance plan.  Call member services at your health plan to determine Diabetes Self-Management Training (Codes  and ) and Medical Nutrition Therapy (Codes 38370 and 30335) benefits and ask which blood glucose monitor brands are covered by your plan.  Please bring the following with you to your appointment:    (1)  List of current medications   (2)  List of Blood Glucose Monitor brands that are covered by your insurance plan  (3)  Blood Glucose Monitor and log book  (4)   Food records for the 3 days prior to your visit    The Certified Diabetes Educator may make diabetes medication adjustments per the CDE Protocol and Collaborative Practice Agreement.                  Your next 10 appointments already scheduled     Oct 12, 2018  9:00 AM CDT   SHORT with Bhumika Oliveira  "QUINN Peña CNP   [facility]    [address]  [facility] [address] [phone]              Who to contact     If you have questions or need follow up information about today's clinic visit or your schedule please contact [facility] directly at [phone].  Normal or non-critical lab and imaging results will be communicated to you by MyChart, letter or phone within 4 business days after the clinic has received the results. If you do not hear from us within 7 days, please contact the clinic through MyChart or phone. If you have a critical or abnormal lab result, we will notify you by phone as soon as possible.  Submit refill requests through [employer] or call your pharmacy and they will forward the refill request to us. Please allow 3 business days for your refill to be completed.          Additional Information About Your Visit        [employer] Information     [employer] lets you send messages to your doctor, view your test results, renew your prescriptions, schedule appointments and more. To sign up, go to [email or web address] . Click on \"Log in\" on the left side of the screen, which will take you to the Welcome page. Then click on \"Sign up Now\" on the right side of the page.     You will be asked to enter the access code listed below, as well as some personal information. Please follow the directions to create your username and password.     Your access code is: [email or web address]  Expires: 2018 10:25 AM     Your access code will  in 90 days. If you need help or a new code, please call your [facility] or [phone].        Care EveryWhere ID     This is your Care EveryWhere ID. This could be used by other organizations to access your [facility] medical records  [id number]        Your Vitals Were     Pulse Temperature Respirations Height BMI (Body Mass Index)       100 97  F (36.1  C) (Tympanic) 18 6' (1.829 m) 36.35 " kg/m2        Blood Pressure from Last 3 Encounters:   09/25/18 154/86   09/07/18 (!) 160/98   12/29/17 (!) 160/96    Weight from Last 3 Encounters:   09/25/18 268 lb (121.6 kg)   12/29/17 282 lb (127.9 kg)   12/27/17 282 lb (127.9 kg)              We Performed the Following     ADMIN 1st VACCINE     Albumin Random Urine Quantitative with Creat Ratio     Comprehensive metabolic panel     DEPRESSION ACTION PLAN (DAP)     DIABETES EDUCATOR REFERRAL     Hemoglobin A1c     Lipid panel reflex to direct LDL Fasting     TDAP VACCINE (ADACEL)          Today's Medication Changes          These changes are accurate as of 9/25/18  9:49 AM.  If you have any questions, ask your nurse or doctor.               These medicines have changed or have updated prescriptions.        Dose/Directions    lisinopril-hydrochlorothiazide 10-12.5 MG per tablet   Commonly known as:  PRINZIDE/ZESTORETIC   This may have changed:  Another medication with the same name was removed. Continue taking this medication, and follow the directions you see here.   Used for:  Benign essential hypertension   Changed by:  Latosha Kendrick APRN CNP        Dose:  1 tablet   Take 1 tablet by mouth daily   Quantity:  90 tablet   Refills:  3         Stop taking these medicines if you haven't already. Please contact your care team if you have questions.     triamcinolone 0.1 % cream   Commonly known as:  KENALOG   Stopped by:  Latosha Kendrick APRN CNP                    Primary Care Provider Office Phone # Fax #    QUINN Vazquez [phone] [address] [place], [place] [address]        Equal Access to Services     [facility] AH: Hadii pradip blairo Soruben, waaxda luqadaha, qaybta kaalmada adeegyada, rosalinda sherman. So [facility] [phone].    ATENCIÓN: Si habla español, tiene a cramer disposición servicios gratuitos de asistencia lingüística. Llame al [phone].    We comply with applicable [facility] civil  rights laws and [place] laws. We do not discriminate on the basis of race, color, national origin, age, disability, sex, sexual orientation, or gender identity.            Thank you!     Thank you for choosing [facility]  for your care. Our goal is always to provide you with excellent care. Hearing back from our patients is one way we can continue to improve our services. Please take a few minutes to complete the written survey that you may receive in the mail after your visit with us. Thank you!             Your Updated Medication List - Protect others around you: Learn how to safely use, store and throw away your medicines at www.disposemymeds.org.          This list is accurate as of 9/25/18  9:49 AM.  Always use your most recent med list.                   Brand Name Dispense Instructions for use Diagnosis    lisinopril-hydrochlorothiazide 10-12.5 MG per tablet    PRINZIDE/ZESTORETIC    90 tablet    Take 1 tablet by mouth daily    Benign essential hypertension       metFORMIN 500 MG 24 hr tablet    GLUCOPHAGE-XR    60 tablet    Take 1 tablet (500 mg) by mouth daily (with dinner)    Type 2 diabetes mellitus without complication, without long-term current use of insulin (H)
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
## 2018-09-25 NOTE — NURSING NOTE
Chief Complaint   Patient presents with     Diabetes     Dizziness       Initial /86 (BP Location: Right arm, Cuff Size: Adult Large)  Pulse 100  Temp 97  F (36.1  C) (Tympanic)  Resp 18  Ht 6' (1.829 m)  Wt 268 lb (121.6 kg)  BMI 36.35 kg/m2 Estimated body mass index is 36.35 kg/(m^2) as calculated from the following:    Height as of this encounter: 6' (1.829 m).    Weight as of this encounter: 268 lb (121.6 kg).    Patient presents to the clinic using No DME    Health Maintenance that is potentially due pending provider review:  Diabetes labs    Possibly completing today per provider review.    Is there anyone who you would like to be able to receive your results? No  If yes have patient fill out ILEANA

## 2018-09-25 NOTE — LETTER
My Depression Action Plan  Name: Mt Painting   Date of Birth 1980  Date: 9/25/2018    My doctor: Latosha Kendrick   My clinic: [address] [id number] [phone]          GREEN    ZONE   Good Control    What it looks like:     Things are going generally well. You have normal up s and down s. You may even feel depressed from time to time, but bad moods usually last less than a day.   What you need to do:  1. Continue to care for yourself (see self care plan)  2. Check your depression survival kit and update it as needed  3. Follow your physician s recommendations including any medication.  4. Do not stop taking medication unless you consult with your physician first.           YELLOW         ZONE Getting Worse    What it looks like:     Depression is starting to interfere with your life.     It may be hard to get out of bed; you may be starting to isolate yourself from others.    Symptoms of depression are starting to last most all day and this has happened for several days.     You may have suicidal thoughts but they are not constant.   What you need to do:     1. Call your care team, your response to treatment will improve if you keep your care team informed of your progress. Yellow periods are signs an adjustment may need to be made.     2. Continue your self-care, even if you have to fake it!    3. Talk to someone in your support network    4. Open up your depression survival kit           RED    ZONE Medical Alert - Get Help    What it looks like:     Depression is seriously interfering with your life.     You may experience these or other symptoms: You can t get out of bed most days, can t work or engage in other necessary activities, you have trouble taking care of basic hygiene, or basic responsibilities, thoughts of suicide or death that will not go away, self-injurious behavior.     What you need to do:  1. Call your care team and  request a same-day appointment. If they are not available (weekends or after hours) call your local crisis line, emergency room or 911.            Depression Self Care Plan / Survival Kit    Self-Care for Depression  Here s the deal. Your body and mind are really not as separate as most people think.  What you do and think affects how you feel and how you feel influences what you do and think. This means if you do things that people who feel good do, it will help you feel better.  Sometimes this is all it takes.  There is also a place for medication and therapy depending on how severe your depression is, so be sure to consult with your medical provider and/ or Behavioral Health Consultant if your symptoms are worsening or not improving.     In order to better manage my stress, I will:    Exercise  Get some form of exercise, every day. This will help reduce pain and release endorphins, the  feel good  chemicals in your brain. This is almost as good as taking antidepressants!  This is not the same as joining a gym and then never going! (they count on that by the way ) It can be as simple as just going for a walk or doing some gardening, anything that will get you moving.      Hygiene   Maintain good hygiene (Get out of bed in the morning, Make your bed, Brush your teeth, Take a shower, and Get dressed like you were going to work, even if you are unemployed).  If your clothes don't fit try to get ones that do.    Diet  I will strive to eat foods that are good for me, drink plenty of water, and avoid excessive sugar, caffeine, alcohol, and other mood-altering substances.  Some foods that are helpful in depression are: complex carbohydrates, B vitamins, flaxseed, fish or fish oil, fresh fruits and vegetables.    Psychotherapy  I agree to participate in Individual Therapy (if recommended).    Medication  If prescribed medications, I agree to take them.  Missing doses can result in serious side effects.  I understand that  drinking alcohol, or other illicit drug use, may cause potential side effects.  I will not stop my medication abruptly without first discussing it with my provider.    Staying Connected With Others  I will stay in touch with my friends, family members, and my primary care provider/team.    Use your imagination  Be creative.  We all have a creative side; it doesn t matter if it s oil painting, sand castles, or mud pies! This will also kick up the endorphins.    Witness Beauty  (AKA stop and smell the roses) Take a look outside, even in mid-winter. Notice colors, textures. Watch the squirrels and birds.     Service to others  Be of service to others.  There is always someone else in need.  By helping others we can  get out of ourselves  and remember the really important things.  This also provides opportunities for practicing all the other parts of the program.    Humor  Laugh and be silly!  Adjust your TV habits for less news and crime-drama and more comedy.    Control your stress  Try breathing deep, massage therapy, biofeedback, and meditation. Find time to relax each day.     My support system    Clinic Contact:  Phone number:    Contact 1:  Phone number:    Contact 2:  Phone number:    [facility]/:  Phone number:    Therapist:  Phone number:    Local crisis center:    Phone number:    Other community support:  Phone number:

## 2018-09-26 ASSESSMENT — PATIENT HEALTH QUESTIONNAIRE - PHQ9: SUM OF ALL RESPONSES TO PHQ QUESTIONS 1-9: 9

## 2018-09-26 ASSESSMENT — ANXIETY QUESTIONNAIRES: GAD7 TOTAL SCORE: 6

## 2018-10-02 NOTE — PROGRESS NOTES
Please Notify Mt  of test results microalbumin within normal limits cholesterol numbers are mildly elevated with cholesterol at 240 and triglycerides at 267 and the LDL at 143.  Would recommend starting a cholesterol medication however the liver enzymes are mildly elevated we will need to see these coming down by decreased alcohol intake decreased Tylenol recheck those lab only appointment in the next 3-4 weeks for rechecking your liver enzymes.    To help with the cholesterol would recommend diet and exercise low-cholesterol diet and increased exercise will recheck the lipids in 3 months to see if we can be placed on a statin.    As you know your hemoglobin A1c was elevated at 7.9 will have follow-up with diabetic educator will start on the metformin  mg once a day if tolerating over the next 2 weeks would increase that to 1000 mg 2 tablets once a day.  He can use it when you currently have a when you run out please let me know and I will send in a new prescription for you. Will have follow-up with the diabetic educator and should have follow-up with primary care provider in the next 2-3 months.    Latosha Kendrick CNP

## 2018-10-04 ENCOUNTER — TELEPHONE (OUTPATIENT)
Dept: FAMILY MEDICINE | Facility: CLINIC | Age: 38
End: 2018-10-04

## 2018-10-04 DIAGNOSIS — E78.5 HYPERLIPIDEMIA LDL GOAL <100: Primary | ICD-10-CM

## 2018-10-04 RX ORDER — ATORVASTATIN CALCIUM 10 MG/1
10 TABLET, FILM COATED ORAL DAILY
Qty: 30 TABLET | Refills: 2 | COMMUNITY
Start: 2018-10-04 | End: 2021-12-28

## 2018-10-04 NOTE — LETTER
[facility]  [address] [place] [id number] [phone]        January 9, 2019    Mt Painting  [id number] [facility] [address]              Dear Mt Painting    This is to remind you that your provider wanted you to return to the clinic for fasting lab test(s),    please fast for 10-12 hours. Morning medications can be taken with water.    You may call our office at [facility] at [phone] to schedule an appointment.    Please disregard this notice if you have already had your labs drawn or made an appointment.          Sincerely,        Latosha Kendrick CNP/ margarita

## 2018-10-04 NOTE — TELEPHONE ENCOUNTER
microalbumin within normal limits cholesterol numbers are mildly elevated with cholesterol at 240 and triglycerides at 2 to 67 and the LDL at 143.  Would recommend starting a cholesterol medication Lipitor 10 mg once a day and recheck in 3  months and continue to work on low-cholesterol  diet and increase exercise.    Rx called to [facility]. Orders in [place].

## 2018-10-29 ENCOUNTER — TELEPHONE (OUTPATIENT)
Dept: FAMILY MEDICINE | Facility: CLINIC | Age: 38
End: 2018-10-29

## 2018-10-29 DIAGNOSIS — F10.29 ALCOHOL DEPENDENCE WITH UNSPECIFIED ALCOHOL-INDUCED DISORDER (H): Primary | ICD-10-CM

## 2018-10-29 NOTE — TELEPHONE ENCOUNTER
Please inform Mt that I have placed him order for a chemical dependency evaluation and also placed a referral for outpatient treatment patient is to call [phone] to set up this appointment.    Latosha Kendrick CNP

## 2018-12-12 DIAGNOSIS — I10 BENIGN ESSENTIAL HYPERTENSION: ICD-10-CM

## 2018-12-12 DIAGNOSIS — E11.9 TYPE 2 DIABETES MELLITUS WITHOUT COMPLICATION, WITHOUT LONG-TERM CURRENT USE OF INSULIN (H): ICD-10-CM

## 2018-12-12 NOTE — TELEPHONE ENCOUNTER
"Requested Prescriptions   Pending Prescriptions Disp Refills     lisinopril-hydrochlorothiazide (PRINZIDE/ZESTORETIC) 10-12.5 MG tablet 90 tablet 3     Sig: Take 1 tablet by mouth daily    Diuretics (Including Combos) Protocol Failed - 12/12/2018 10:42 AM       Failed - Blood pressure under 140/90 in past 12 months    BP Readings from Last 3 Encounters:   09/25/18 154/86   09/07/18 (!) 160/98   12/29/17 (!) 160/96                Failed - Normal serum sodium on file in past 12 months    Recent Labs   Lab Test 09/25/18  0914   *             Passed - Recent (12 mo) or future (30 days) visit within the authorizing provider's specialty    Patient had office visit in the last 12 months or has a visit in the next 30 days with authorizing provider or within the authorizing provider's specialty.  See \"Patient Info\" tab in inbasket, or \"Choose Columns\" in Meds & Orders section of the refill encounter.      Last Written Prescription Date:  10/17/17  Last Fill Quantity: 90,  # refills: 3   Last office visit: 9/25/2018 with prescribing provider:     Future Office Visit:               Passed - Patient is age 18 or older       Passed - Normal serum creatinine on file in past 12 months    Recent Labs   Lab Test 09/25/18  0914   CR 0.82             Passed - Normal serum potassium on file in past 12 months    Recent Labs   Lab Test 09/25/18  0914   POTASSIUM 4.2                    metFORMIN (GLUCOPHAGE-XR) 500 MG 24 hr tablet 60 tablet 0     Sig: Take 1 tablet (500 mg) by mouth daily (with dinner)    Biguanide Agents Failed - 12/12/2018 10:42 AM       Failed - Blood pressure less than 140/90 in past 6 months    BP Readings from Last 3 Encounters:   09/25/18 154/86   09/07/18 (!) 160/98   12/29/17 (!) 160/96                Passed - Patient has documented LDL within the past 12 mos.    Recent Labs   Lab Test 09/25/18  0914   *            Passed - Patient has had a Microalbumin in the past 15 mos.    Recent Labs   Lab Test " "09/25/18  0923   MICROL 18   UMALCR 7.41            Passed - Patient is age 10 or older       Passed - Patient has documented A1c within the specified period of time.    If HgbA1C is 8 or greater, it needs to be on file within the past 3 months.  If less than 8, must be on file within the past 6 months.     Recent Labs   Lab Test 09/25/18 0914   A1C 7.9*            Passed - Patient's CR is NOT>1.4 OR Patient's EGFR is NOT<45 within past 12 mos.    Recent Labs   Lab Test 09/25/18 0914   GFRESTIMATED >90   GFRESTBLACK >90       Recent Labs   Lab Test 09/25/18 0914   CR 0.82            Passed - Patient does NOT have a diagnosis of CHF.       Passed - Recent (6 mo) or future (30 days) visit within the authorizing provider's specialty    Patient had office visit in the last 6 months or has a visit in the next 30 days with authorizing provider or within the authorizing provider's specialty.  See \"Patient Info\" tab in inbasket, or \"Choose Columns\" in Meds & Orders section of the refill encounter.      Last Written Prescription Date:  9/7/18  Last Fill Quantity: 60,  # refills: 0   Last office visit: 9/25/2018 with prescribing provider:     Future Office Visit:                "

## 2018-12-14 RX ORDER — METFORMIN HCL 500 MG
500 TABLET, EXTENDED RELEASE 24 HR ORAL
Qty: 60 TABLET | Refills: 0 | Status: SHIPPED | OUTPATIENT
Start: 2018-12-14 | End: 2021-04-06

## 2018-12-14 RX ORDER — LISINOPRIL/HYDROCHLOROTHIAZIDE 10-12.5 MG
1 TABLET ORAL DAILY
Qty: 90 TABLET | Refills: 3 | Status: SHIPPED | OUTPATIENT
Start: 2018-12-14 | End: 2021-04-06

## 2019-02-19 ENCOUNTER — TELEPHONE (OUTPATIENT)
Dept: FAMILY MEDICINE | Facility: CLINIC | Age: 39
End: 2019-02-19

## 2019-02-19 NOTE — TELEPHONE ENCOUNTER
Patient was told to return for fasting labs, reminder letter was sent to patient on 01/09/2019, patient has still not scheduled an appointment.

## 2019-04-23 ENCOUNTER — TELEPHONE (OUTPATIENT)
Dept: FAMILY MEDICINE | Facility: CLINIC | Age: 39
End: 2019-04-23

## 2019-04-23 NOTE — TELEPHONE ENCOUNTER
Panel Management Review      Patient has the following on his problem list:     Diabetes    ASA: Failed    Last A1C  Lab Results   Component Value Date    A1C 7.9 09/25/2018    A1C 7.5 08/08/2017     A1C tested: Passed    Last LDL:    Lab Results   Component Value Date    CHOL 240 09/25/2018     Lab Results   Component Value Date    HDL 44 09/25/2018     Lab Results   Component Value Date     09/25/2018     Lab Results   Component Value Date    TRIG 267 09/25/2018     No results found for: CHOLHDLRATIO  Lab Results   Component Value Date    NHDL 196 09/25/2018       Is the patient on a Statin? YES             Is the patient on Aspirin? NO    Medications     HMG CoA Reductase Inhibitors     atorvastatin (LIPITOR) 10 MG tablet             Last three blood pressure readings:  BP Readings from Last 3 Encounters:   09/25/18 154/86   09/07/18 (!) 160/98   12/29/17 (!) 160/96       Date of last diabetes office visit: 9/25/18     Tobacco History:     History   Smoking Status     Current Every Day Smoker     Packs/day: 1.00     Types: Cigarettes   Smokeless Tobacco     Current User           Composite cancer screening  Chart review shows that this patient is due/due soon for the following None  Summary:    Patient is due/failing the following:   A1C    Action needed:   Patient needs office visit for a diabetes check.    Type of outreach:    Phone, left message for patient to call back.     Questions for provider review:    None                                                                                                                                    Daksha Encarnacion MA      Chart routed to Care Team .

## 2019-07-30 ENCOUNTER — TELEPHONE (OUTPATIENT)
Dept: FAMILY MEDICINE | Facility: CLINIC | Age: 39
End: 2019-07-30

## 2019-07-30 NOTE — TELEPHONE ENCOUNTER
Panel Management Review      Patient has the following on his problem list:     Diabetes    ASA: Failed    Last A1C  Lab Results   Component Value Date    A1C 7.9 09/25/2018    A1C 7.5 08/08/2017     A1C tested: Passed    Last LDL:    Lab Results   Component Value Date    CHOL 240 09/25/2018     Lab Results   Component Value Date    HDL 44 09/25/2018     Lab Results   Component Value Date     09/25/2018     Lab Results   Component Value Date    TRIG 267 09/25/2018     No results found for: CHOLHDLRATIO  Lab Results   Component Value Date    NHDL 196 09/25/2018       Is the patient on a Statin? YES             Is the patient on Aspirin? NO    Medications     HMG CoA Reductase Inhibitors     atorvastatin (LIPITOR) 10 MG tablet             Last three blood pressure readings:  BP Readings from Last 3 Encounters:   09/25/18 154/86   09/07/18 (!) 160/98   12/29/17 (!) 160/96       Date of last diabetes office visit: 4/2019     Tobacco History:     History   Smoking Status     Current Every Day Smoker     Packs/day: 1.00     Types: Cigarettes   Smokeless Tobacco     Current User           Composite cancer screening  Chart review shows that this patient is due/due soon for the following None  Summary:    Patient is due/failing the following:   A1C, BP CHECK and PHQ9    Action needed:   Patient needs office visit for a diabetes check and labs.    Type of outreach:    Phone, spoke to patient.  He was unable to schedule an appointment but states that he will call back.    Questions for provider review:    None                                                                                                                                    Daksha Encarnacion MA      Chart routed to Care Team .

## 2019-08-13 NOTE — TELEPHONE ENCOUNTER
Called and spoke with patient. He states that he forgot to schedule but he will call back this week to make an appointment.      Daksha Encarnacion MA

## 2019-08-20 ENCOUNTER — TELEPHONE (OUTPATIENT)
Dept: FAMILY MEDICINE | Facility: CLINIC | Age: 39
End: 2019-08-20

## 2019-08-20 NOTE — TELEPHONE ENCOUNTER
Panel Management Review      Patient has the following on his problem list:     Depression / Dysthymia review    Measure:  Needs PHQ-9 score of 4 or less during index window.  Administer PHQ-9 and if score is 5 or more, send encounter to provider for next steps.    5 - 7 month window range:     PHQ-9 SCORE 9/25/2018   PHQ-9 Total Score MyChart 9 (Mild depression)   PHQ-9 Total Score 9       If PHQ-9 recheck is 5 or more, route to provider for next steps.    Patient is due for:  PHQ9    Diabetes    ASA: Not Required     Last A1C  Lab Results   Component Value Date    A1C 7.9 09/25/2018    A1C 7.5 08/08/2017     A1C tested: Passed    Last LDL:    Lab Results   Component Value Date    CHOL 240 09/25/2018     Lab Results   Component Value Date    HDL 44 09/25/2018     Lab Results   Component Value Date     09/25/2018     Lab Results   Component Value Date    TRIG 267 09/25/2018     No results found for: CHOLHDLRATIO  Lab Results   Component Value Date    NHDL 196 09/25/2018       Is the patient on a Statin? YES             Is the patient on Aspirin? NO    Medications     HMG CoA Reductase Inhibitors     atorvastatin (LIPITOR) 10 MG tablet             Last three blood pressure readings:  BP Readings from Last 3 Encounters:   09/25/18 154/86   09/07/18 (!) 160/98   12/29/17 (!) 160/96       Date of last diabetes office visit: 9/25/18     Tobacco History:     History   Smoking Status     Current Every Day Smoker     Packs/day: 1.00     Types: Cigarettes   Smokeless Tobacco     Current User           Composite cancer screening  Chart review shows that this patient is due/due soon for the following None  Summary:    Patient is due/failing the following:   A1C and PHQ9    Action needed:   Patient needs office visit for a diabetes check and labs.    Type of outreach:    Phone, left message for patient to call back.     Questions for provider review:    None                                                                                                                                     Daksha Encarnacion MA      Chart routed to Care Team .

## 2019-08-26 NOTE — TELEPHONE ENCOUNTER
Called and left message for patient to call clinic back. He needs to schedule a diabetes check with labs.    Daksha Encarnacion MA

## 2019-09-09 ENCOUNTER — TRANSFERRED RECORDS (OUTPATIENT)
Dept: MULTI SPECIALTY CLINIC | Facility: CLINIC | Age: 39
End: 2019-09-09

## 2019-09-09 LAB
ALT SERPL-CCNC: 135 U/L (ref 12–78)
AST SERPL-CCNC: 115 U/L (ref 15–37)
CHOLEST SERPL-MCNC: 211 MG/DL (ref 0–200)
CREAT SERPL-MCNC: 0.83 MG/DL (ref 0.6–1.3)
GFR SERPL CREATININE-BSD FRML MDRD: 104.3 ML/MIN/1.73M2
GLUCOSE SERPL-MCNC: 225 MG/DL (ref 74–106)
HBA1C MFR BLD: 8.2 % (ref 4–6.2)
HDLC SERPL-MCNC: 42 MG/DL (ref 40–60)
LDLC SERPL CALC-MCNC: 141.2 MG/DL (ref 0–130)
POTASSIUM SERPL-SCNC: 3.6 MMOL/L (ref 3.5–5.1)
TRIGL SERPL-MCNC: 139 MG/DL (ref 0–200)

## 2019-09-16 ENCOUNTER — TELEPHONE (OUTPATIENT)
Dept: FAMILY MEDICINE | Facility: CLINIC | Age: 39
End: 2019-09-16

## 2019-09-16 NOTE — TELEPHONE ENCOUNTER
Panel Management Review      Patient has the following on his problem list:     Diabetes    ASA: Passed    Last A1C  Lab Results   Component Value Date    A1C 7.9 09/25/2018    A1C 7.5 08/08/2017     A1C tested: Passed    Last LDL:    Lab Results   Component Value Date    CHOL 240 09/25/2018     Lab Results   Component Value Date    HDL 44 09/25/2018     Lab Results   Component Value Date     09/25/2018     Lab Results   Component Value Date    TRIG 267 09/25/2018     No results found for: CHOLHDLRATIO  Lab Results   Component Value Date    NHDL 196 09/25/2018       Is the patient on a Statin? YES             Is the patient on Aspirin? YES    Medications     HMG CoA Reductase Inhibitors     atorvastatin (LIPITOR) 10 MG tablet       Salicylates     ASPIRIN NOT PRESCRIBED (INTENTIONAL)             Last three blood pressure readings:  BP Readings from Last 3 Encounters:   09/25/18 154/86   09/07/18 (!) 160/98   12/29/17 (!) 160/96       Date of last diabetes office visit: 9/25/18     Tobacco History:     History   Smoking Status     Current Every Day Smoker     Packs/day: 1.00     Types: Cigarettes   Smokeless Tobacco     Current User           Composite cancer screening  Chart review shows that this patient is due/due soon for the following None  Summary:    Patient is due/failing the following:   microalbumin, A1C and LDL    Action needed:   Patient needs office visit for a diabetes check and labs.    Type of outreach:    Phone, left message for patient to call back.     Questions for provider review:    None                                                                                                                                    Daksha Encarnacion MA      Chart routed to Care Team .

## 2019-09-23 NOTE — TELEPHONE ENCOUNTER
Called and spoke with patient. He went to [facility] on 9/9/19 and had his diabetes labs done. Hemoglobin A1C was 8.2. BP was 152/94. He states that the provider there changed his metformin dose.    Discussed with Latosha. Will reach out in three months to recheck A1C. Abstracted A1C from Care Everywhere.    Daksha Encarnacion MA

## 2021-04-06 ENCOUNTER — VIRTUAL VISIT (OUTPATIENT)
Dept: FAMILY MEDICINE | Facility: CLINIC | Age: 41
End: 2021-04-06
Payer: COMMERCIAL

## 2021-04-06 DIAGNOSIS — I10 BENIGN ESSENTIAL HYPERTENSION: ICD-10-CM

## 2021-04-06 DIAGNOSIS — F33.0 MILD EPISODE OF RECURRENT MAJOR DEPRESSIVE DISORDER (H): Primary | ICD-10-CM

## 2021-04-06 DIAGNOSIS — Z13.220 SCREENING FOR HYPERLIPIDEMIA: ICD-10-CM

## 2021-04-06 DIAGNOSIS — E11.9 TYPE 2 DIABETES MELLITUS WITHOUT COMPLICATION, WITHOUT LONG-TERM CURRENT USE OF INSULIN (H): ICD-10-CM

## 2021-04-06 PROCEDURE — 99204 OFFICE O/P NEW MOD 45 MIN: CPT | Mod: 95 | Performed by: NURSE PRACTITIONER

## 2021-04-06 RX ORDER — METFORMIN HYDROCHLORIDE 750 MG/1
1 TABLET, EXTENDED RELEASE ORAL DAILY
COMMUNITY
Start: 2020-04-21 | End: 2021-04-06

## 2021-04-06 RX ORDER — LISINOPRIL 20 MG/1
20 TABLET ORAL DAILY
COMMUNITY
Start: 2020-07-17 | End: 2021-04-06

## 2021-04-06 RX ORDER — ESCITALOPRAM OXALATE 10 MG/1
10 TABLET ORAL DAILY
COMMUNITY
Start: 2020-07-17 | End: 2021-04-06

## 2021-04-06 RX ORDER — LISINOPRIL 20 MG/1
20 TABLET ORAL DAILY
Qty: 90 TABLET | Refills: 0 | Status: SHIPPED | OUTPATIENT
Start: 2021-04-06 | End: 2021-09-28

## 2021-04-06 RX ORDER — METFORMIN HYDROCHLORIDE 750 MG/1
750 TABLET, EXTENDED RELEASE ORAL DAILY
Qty: 90 TABLET | Refills: 0 | Status: SHIPPED | OUTPATIENT
Start: 2021-04-06 | End: 2021-09-28

## 2021-04-06 RX ORDER — ESCITALOPRAM OXALATE 10 MG/1
10 TABLET ORAL DAILY
Qty: 90 TABLET | Refills: 0 | Status: SHIPPED | OUTPATIENT
Start: 2021-04-06 | End: 2021-09-28

## 2021-04-06 ASSESSMENT — ANXIETY QUESTIONNAIRES
2. NOT BEING ABLE TO STOP OR CONTROL WORRYING: NOT AT ALL
7. FEELING AFRAID AS IF SOMETHING AWFUL MIGHT HAPPEN: NOT AT ALL
5. BEING SO RESTLESS THAT IT IS HARD TO SIT STILL: NOT AT ALL
6. BECOMING EASILY ANNOYED OR IRRITABLE: NOT AT ALL
GAD7 TOTAL SCORE: 4
3. WORRYING TOO MUCH ABOUT DIFFERENT THINGS: NOT AT ALL
1. FEELING NERVOUS, ANXIOUS, OR ON EDGE: SEVERAL DAYS

## 2021-04-06 ASSESSMENT — PATIENT HEALTH QUESTIONNAIRE - PHQ9
SUM OF ALL RESPONSES TO PHQ QUESTIONS 1-9: 0
5. POOR APPETITE OR OVEREATING: NEARLY EVERY DAY

## 2021-04-06 NOTE — PATIENT INSTRUCTIONS
Type 2 diabetes mellitus without complication, without long-term current use of insulin (H)  Chronic, uncontrolled.  Last A1c 4/2020 was 10.5.  Patient has been off medications for the last couple of months.  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  Discussed the long-term effects of diabetes and patient understands.  Will start medication and advised patient to schedule a lab only appointment to check labs.  Also highly recommend scheduling a diabetic eye exam.  Encouraged to increase daily physical activity, follow a healthy diabetic diet as well as quit smoking.  Advised patient would like to see in clinic face-to-face in 3 months.  Patient is agreeable.  - OPTOMETRY REFERRAL; Future  - Albumin Random Urine Quantitative with Creat Ratio; Future  - Hemoglobin A1c - FUTURE S+90; Future  - metFORMIN (GLUCOPHAGE-XR) 750 MG 24 hr tablet; Take 1 tablet (750 mg) by mouth daily    Benign essential hypertension  Chronic, unsure if stable at this point.  Patient does not check blood pressures at home.  Denies any symptoms.  Has been out of medications for the last couple of months.  Advised to restart, schedule lab only appointment as above to recheck metabolic panel.  Advised to schedule a nurse only visit at the time of lab for a blood pressure check as well.  See back in clinic in 3 months.  - Basic metabolic panel - FUTURE  S+90; Future  - lisinopril (ZESTRIL) 20 MG tablet; Take 1 tablet (20 mg) by mouth daily    Screening for hyperlipidemia  Screen.  Check with labs as above.  - Lipid panel reflex to direct LDL Fasting; Future    Mild episode of recurrent major depressive disorder (H)  Chronic, stable.  Restart Lexapro.  - escitalopram (LEXAPRO) 10 MG tablet; Take 1 tablet (10 mg) by mouth daily

## 2021-04-06 NOTE — PROGRESS NOTES
Mt is a 41 year old who is being evaluated via a billable video visit.      How would you like to obtain your AVS? Mail a copy  If the video visit is dropped, the invitation should be resent by: Text to cell phone: [phone]  Will anyone else be joining your video visit?       Video Start Time: 12:33 PM    Assessment & Plan     Type 2 diabetes mellitus without complication, without long-term current use of insulin (H)  Chronic, uncontrolled.  Last A1c 4/2020 was 10.5.  Patient has been off medications for the last couple of months.  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  Discussed the long-term effects of diabetes and patient understands.  Will start medication and advised patient to schedule a lab only appointment to check labs.  Also highly recommend scheduling a diabetic eye exam.  Encouraged to increase daily physical activity, follow a healthy diabetic diet as well as quit smoking.  Advised patient would like to see in clinic face-to-face in 3 months.  Patient is agreeable.  - OPTOMETRY REFERRAL; Future  - Albumin Random Urine Quantitative with Creat Ratio; Future  - Hemoglobin A1c - FUTURE S+90; Future  - metFORMIN (GLUCOPHAGE-XR) 750 MG 24 hr tablet; Take 1 tablet (750 mg) by mouth daily    Benign essential hypertension  Chronic, unsure if stable at this point.  Patient does not check blood pressures at home.  Denies any symptoms.  Has been out of medications for the last couple of months.  Advised to restart, schedule lab only appointment as above to recheck metabolic panel.  Advised to schedule a nurse only visit at the time of lab for a blood pressure check as well.  See back in clinic in 3 months.  - Basic metabolic panel - FUTURE  S+90; Future  - lisinopril (ZESTRIL) 20 MG tablet; Take 1 tablet (20 mg) by mouth daily    Screening for hyperlipidemia  Screen.  Check with labs as above.  - Lipid panel reflex to direct LDL Fasting; Future    Mild episode of recurrent  major depressive disorder (H)  Chronic, stable.  Restart Lexapro.  - escitalopram (LEXAPRO) 10 MG tablet; Take 1 tablet (10 mg) by mouth daily    Review of prior external note(s) from - [facility]where information from [facility] reviewed         Tobacco Cessation:   reports that he has been smoking cigarettes. He has been smoking about 1.00 pack per day. He uses smokeless tobacco.  Tobacco Cessation Action Plan: Information offered: Patient not interested at this time    See Patient Instructions    Return in about 2 weeks (around 4/20/2021) for BP Recheck, Lab Work.    Swati Howard, QUINN CNP  [facility]    Michelle Amor is a 41 year old who presents for the following health issues     HPI   Chief Complaint   Patient presents with     Depression     Diabetes     Hypertension     Medication Reconciliation     per patient, patient has been out of medication for 1 month Patient states has never taken medication for cholesterol.        Diabetes Follow-up    How often are you checking your blood sugar? A few times per month  What time of day are you checking your blood sugars (select all that apply)?  Before and after meals  Have you had any blood sugars above 200?  Yes because not taking medication  When was on medication glucose was around 100  Have you had any blood sugars below 70?  No    What symptoms do you notice when your blood sugar is low?  None    What concerns do you have today about your diabetes? Other: off medication for a month     Do you have any of these symptoms? (Select all that apply)  No numbness or tingling in feet.  No redness, sores or blisters on feet.  No complaints of excessive thirst.  No reports of blurry vision.  No significant changes to weight.    Have you had a diabetic eye exam in the last 12 months? No    Has been off of medication for approximately 2 months now due to not being able to get into clinic     BP Readings from Last 2 Encounters:   09/25/18  154/86   09/07/18 (!) 160/98     Hemoglobin A1C (%)   Date Value   09/09/2019 8.2 (H)   09/25/2018 7.9 (H)     LDL Cholesterol Calculated (mg/dL)   Date Value   09/09/2019 141.2 (H)   09/25/2018 143 (H)               Hypertension Follow-up      Do you check your blood pressure regularly outside of the clinic? No     Are you following a low salt diet? Yes    Are your blood pressures ever more than 140 on the top number (systolic) OR more   than 90 on the bottom number (diastolic), for example 140/90? No N/A    Depression and Anxiety Follow-Up    How are you doing with your depression since your last visit? Improved     How are you doing with your anxiety since your last visit?  Improved     Are you having other symptoms that might be associated with depression or anxiety? No    Have you had a significant life event? No     Do you have any concerns with your use of alcohol or other drugs? No    Social History     Tobacco Use     Smoking status: Current Every Day Smoker     Packs/day: 1.00     Types: Cigarettes     Smokeless tobacco: Current User   Substance Use Topics     Alcohol use: Yes     Comment: very rare     Drug use: No     PHQ 9/25/2018 4/6/2021   PHQ-9 Total Score 9 0   Q9: Thoughts of better off dead/self-harm past 2 weeks Not at all Not at all     MAX-7 SCORE 9/25/2018 4/6/2021   Total Score 6 (mild anxiety) -   Total Score 6 4     Last PHQ-9 4/6/2021   1.  Little interest or pleasure in doing things 0   2.  Feeling down, depressed, or hopeless 0   3.  Trouble falling or staying asleep, or sleeping too much 0   4.  Feeling tired or having little energy 0   5.  Poor appetite or overeating 0   6.  Feeling bad about yourself 0   7.  Trouble concentrating 0   8.  Moving slowly or restless 0   Q9: Thoughts of better off dead/self-harm past 2 weeks 0   PHQ-9 Total Score 0     MAX-7  4/6/2021   1. Feeling nervous, anxious, or on edge 1   2. Not being able to stop or control worrying 0   3. Worrying too much about  different things 0   4. Trouble relaxing 3   5. Being so restless that it is hard to sit still 0   6. Becoming easily annoyed or irritable 0   7. Feeling afraid, as if something awful might happen 0   MAX-7 Total Score 4       Suicide Assessment Five-step Evaluation and Treatment (SAFE-T)      How many servings of fruits and vegetables do you eat daily?  2-3    On average, how many sweetened beverages do you drink each day (Examples: soda, juice, sweet tea, etc.  Do NOT count diet or artificially sweetened beverages)?   5 [employer] energy    How many days per week do you exercise enough to make your heart beat faster? 7    How many minutes a day do you exercise enough to make your heart beat faster? 60 or more  How many days per week do you miss taking your medication? 7    What makes it hard for you to take your medications?  did not refill because needed an appointment  Patient states works all the time      Review of Systems   Constitutional, HEENT, cardiovascular, pulmonary, gi and gu systems are negative, except as otherwise noted.      Objective           Vitals:  No vitals were obtained today due to virtual visit.    Physical Exam   GENERAL: Healthy, alert and no distress  EYES: Eyes grossly normal to inspection.  No discharge or erythema, or obvious scleral/conjunctival abnormalities.  RESP: No audible wheeze, cough, or visible cyanosis.  No visible retractions or increased work of breathing.    SKIN: Visible skin clear. No significant rash, abnormal pigmentation or lesions.  NEURO: Cranial nerves grossly intact.  Mentation and speech appropriate for age.  PSYCH: Mentation appears normal, affect normal/bright, judgement and insight intact, normal speech and appearance well-groomed.    Diagnostic Test Results:  Pending            Video-Visit Details    Type of service:  Video Visit    Video End Time:12:49 PM    Originating Location (pt. Location): Home    Distant Location (provider location):  [facility]     Platform used for Video Visit: Carly

## 2021-04-07 ASSESSMENT — ANXIETY QUESTIONNAIRES: GAD7 TOTAL SCORE: 4

## 2021-09-28 DIAGNOSIS — I10 BENIGN ESSENTIAL HYPERTENSION: ICD-10-CM

## 2021-09-28 DIAGNOSIS — F33.0 MILD EPISODE OF RECURRENT MAJOR DEPRESSIVE DISORDER (H): ICD-10-CM

## 2021-09-28 DIAGNOSIS — E11.9 TYPE 2 DIABETES MELLITUS WITHOUT COMPLICATION, WITHOUT LONG-TERM CURRENT USE OF INSULIN (H): ICD-10-CM

## 2021-09-28 NOTE — TELEPHONE ENCOUNTER
Reason for Call:  Medication or medication refill:    Do you use a [facility]?  Name of the pharmacy and phone number for the current request:  [facility] in [place]    Name of the medication requested: Lexapro, Lisinopril and Metformin    Other request: Pt currently in [place] for work, he will run out of meds by Friday. He will be back in about two weeks. Unable to schedule a virtual appt, can he get a refill and will make an appt once he gets back.     Can we leave a detailed message on this number? YES    Phone number patient can be reached at: Home number on file [phone] (home)    Best Time: anytime    Call taken on 9/28/2021 at 9:49 AM by Kristi Lyle

## 2021-09-28 NOTE — TELEPHONE ENCOUNTER
BP Readings from Last 6 Encounters:   09/25/18 154/86   09/07/18 (!) 160/98   12/29/17 (!) 160/96   12/27/17 (!) 138/100   10/17/17 132/88   08/09/17 160/90     PHQ 9/25/2018 4/6/2021   PHQ-9 Total Score 9 0   Q9: Thoughts of better off dead/self-harm past 2 weeks Not at all Not at all     MAX-7 SCORE 9/25/2018 4/6/2021   Total Score 6 (mild anxiety) -   Total Score 6 4     Lab Results   Component Value Date    A1C 8.2 09/09/2019    A1C 7.9 09/25/2018    A1C 7.5 08/08/2017     Last Comprehensive Metabolic Panel:  Sodium   Date Value Ref Range Status   09/25/2018 132 (L) 133 - 144 mmol/L Final     Potassium   Date Value Ref Range Status   09/09/2019 3.6 3.5 - 5.1 mmol/L Final     Chloride   Date Value Ref Range Status   09/25/2018 95 94 - 109 mmol/L Final     Carbon Dioxide   Date Value Ref Range Status   09/25/2018 30 20 - 32 mmol/L Final     Anion Gap   Date Value Ref Range Status   09/25/2018 7 3 - 14 mmol/L Final     Glucose   Date Value Ref Range Status   09/09/2019 225.0 (H) 74.0 - 106.0 mg/dL Final     Urea Nitrogen   Date Value Ref Range Status   09/25/2018 16 7 - 30 mg/dL Final     Creatinine   Date Value Ref Range Status   09/09/2019 0.83 0.60 - 1.30 mg/dL Final     GFR Estimate   Date Value Ref Range Status   09/09/2019 104.3 >=90 ml/min/1.73m2 Final     Calcium   Date Value Ref Range Status   09/25/2018 9.3 8.5 - 10.1 mg/dL Final       Pt currently in [place], returning in approx 2 wks at which time states will schedule F/U, labs with Swati.  Forwarded to TERESA Reina, for review, refill auth in Swati's absence.  DENITA White RN

## 2021-09-29 RX ORDER — METFORMIN HYDROCHLORIDE 750 MG/1
750 TABLET, EXTENDED RELEASE ORAL DAILY
Qty: 30 TABLET | Refills: 0 | Status: SHIPPED | OUTPATIENT
Start: 2021-09-29 | End: 2021-11-16

## 2021-09-29 RX ORDER — LISINOPRIL 20 MG/1
20 TABLET ORAL DAILY
Qty: 30 TABLET | Refills: 0 | Status: SHIPPED | OUTPATIENT
Start: 2021-09-29 | End: 2021-11-16

## 2021-09-29 RX ORDER — ESCITALOPRAM OXALATE 10 MG/1
10 TABLET ORAL DAILY
Qty: 30 TABLET | Refills: 0 | Status: SHIPPED | OUTPATIENT
Start: 2021-09-29 | End: 2021-11-16

## 2021-11-16 ENCOUNTER — VIRTUAL VISIT (OUTPATIENT)
Dept: FAMILY MEDICINE | Facility: CLINIC | Age: 41
End: 2021-11-16
Payer: COMMERCIAL

## 2021-11-16 DIAGNOSIS — F33.0 MILD EPISODE OF RECURRENT MAJOR DEPRESSIVE DISORDER (H): ICD-10-CM
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
## 2021-11-16 DIAGNOSIS — Z72.0 TOBACCO ABUSE DISORDER: ICD-10-CM

## 2021-11-16 DIAGNOSIS — E11.9 TYPE 2 DIABETES MELLITUS WITHOUT COMPLICATION, WITHOUT LONG-TERM CURRENT USE OF INSULIN (H): Primary | ICD-10-CM

## 2021-11-16 DIAGNOSIS — S61.451D DOG BITE OF RIGHT HAND, SUBSEQUENT ENCOUNTER: ICD-10-CM

## 2021-11-16 DIAGNOSIS — I10 BENIGN ESSENTIAL HYPERTENSION: ICD-10-CM

## 2021-11-16 PROCEDURE — 99214 OFFICE O/P EST MOD 30 MIN: CPT | Mod: 95 | Performed by: NURSE PRACTITIONER

## 2021-11-16 RX ORDER — LISINOPRIL 20 MG/1
20 TABLET ORAL DAILY
Qty: 30 TABLET | Refills: 0 | Status: SHIPPED | OUTPATIENT
Start: 2021-11-16 | End: 2021-12-28

## 2021-11-16 RX ORDER — ESCITALOPRAM OXALATE 10 MG/1
10 TABLET ORAL DAILY
Qty: 30 TABLET | Refills: 0 | Status: SHIPPED | OUTPATIENT
Start: 2021-11-16 | End: 2021-12-28

## 2021-11-16 RX ORDER — METFORMIN HYDROCHLORIDE 750 MG/1
750 TABLET, EXTENDED RELEASE ORAL 2 TIMES DAILY WITH MEALS
Qty: 60 TABLET | Refills: 0 | Status: SHIPPED | OUTPATIENT
Start: 2021-11-16 | End: 2021-12-28

## 2021-11-16 ASSESSMENT — ANXIETY QUESTIONNAIRES
2. NOT BEING ABLE TO STOP OR CONTROL WORRYING: NOT AT ALL
IF YOU CHECKED OFF ANY PROBLEMS ON THIS QUESTIONNAIRE, HOW DIFFICULT HAVE THESE PROBLEMS MADE IT FOR YOU TO DO YOUR WORK, TAKE CARE OF THINGS AT HOME, OR GET ALONG WITH OTHER PEOPLE: NOT DIFFICULT AT ALL
1. FEELING NERVOUS, ANXIOUS, OR ON EDGE: NOT AT ALL
6. BECOMING EASILY ANNOYED OR IRRITABLE: NOT AT ALL
3. WORRYING TOO MUCH ABOUT DIFFERENT THINGS: NOT AT ALL
5. BEING SO RESTLESS THAT IT IS HARD TO SIT STILL: NOT AT ALL
GAD7 TOTAL SCORE: 0
7. FEELING AFRAID AS IF SOMETHING AWFUL MIGHT HAPPEN: NOT AT ALL

## 2021-11-16 ASSESSMENT — PATIENT HEALTH QUESTIONNAIRE - PHQ9: 5. POOR APPETITE OR OVEREATING: NOT AT ALL

## 2021-11-16 NOTE — PATIENT INSTRUCTIONS
Type 2 diabetes mellitus without complication, without long-term current use of insulin (H)  Chronic, uncontrolled.  Last A1c from 2020 was 10.5, patient has not had any updated A1c since.  Has a difficult time getting into clinic to due to job, is out of town a lot.  Patient checks blood sugars a few times a week, mostly over 200s.  Sometimes forgets to take medication.  Discussed importance of diabetes management, recommend at this time increasing Metformin to twice daily and scheduling an in person visit with provider in approximately 2 to 3 weeks when patient is back in town.  If provider schedule is full, recommended patient MyChart message or call clinic and will get him on schedule.  Highly encouraged patient to focus on nutrition in the interim and will also work on getting him into see diabetic education.   - metFORMIN (GLUCOPHAGE-XR) 750 MG 24 hr tablet; Take 1 tablet (750 mg) by mouth 2 times daily (with meals)  - [facility] Adult Diabetes Educator Referral; Future    Benign essential hypertension  Chronic, unknown if stable, no recent in clinic blood pressures.  Patient does not check at home.  Will continue on lisinopril and recheck in office in approximately 2 to 3 weeks as discussed above.  Encouraged patient to work on nutrition as well as smoking cessation.  - lisinopril (ZESTRIL) 20 MG tablet; Take 1 tablet (20 mg) by mouth daily    Mild episode of recurrent major depressive disorder (H)  Chronic, stable.  No changes.  Continue Lexapro.  - escitalopram (LEXAPRO) 10 MG tablet; Take 1 tablet (10 mg) by mouth daily    Dog bite of right hand, subsequent encounter  Dog bite of right hand approximately 1-1/2 weeks ago, was seen in the ER when out of town.  Covered with antibiotics at that time.  Still has some mild erythema and a hard lump or bite was.  Patient denies any warmth or drainage.  Recommend continued monitoring and if develops any warmth, increased redness, increased pain or fever to be seen in  clinic.    Tobacco abuse disorder  Chronic, discussed and encouraged smoking cessation today.

## 2021-11-16 NOTE — PROGRESS NOTES
Mt is a 41 year old who is being evaluated via a billable video visit.      How would you like to obtain your AVS? Mail a copy  If the video visit is dropped, the invitation should be resent by: Text to cell phone: [phone]  Will anyone else be joining your video visit? No      Video Start Time: 10:04 AM    Assessment & Plan     Type 2 diabetes mellitus without complication, without long-term current use of insulin (H)  Chronic, uncontrolled.  Last A1c from 2020 was 10.5, patient has not had any updated A1c since.  Has a difficult time getting into clinic to due to job, is out of town a lot.  Patient checks blood sugars a few times a week, mostly over 200s.  Sometimes forgets to take medication.  Discussed importance of diabetes management, recommend at this time increasing Metformin to twice daily and scheduling an in person visit with provider in approximately 2 to 3 weeks when patient is back in town.  If provider schedule is full, recommended patient MyChart message or call clinic and will get him on schedule.  Highly encouraged patient to focus on nutrition in the interim and will also work on getting him into see diabetic education.   - metFORMIN (GLUCOPHAGE-XR) 750 MG 24 hr tablet; Take 1 tablet (750 mg) by mouth 2 times daily (with meals)  - [facility] Adult Diabetes Educator Referral; Future    Benign essential hypertension  Chronic, unknown if stable, no recent in clinic blood pressures.  Patient does not check at home.  Will continue on lisinopril and recheck in office in approximately 2 to 3 weeks as discussed above.  Encouraged patient to work on nutrition as well as smoking cessation.  - lisinopril (ZESTRIL) 20 MG tablet; Take 1 tablet (20 mg) by mouth daily    Mild episode of recurrent major depressive disorder (H)  Chronic, stable.  No changes.  Continue Lexapro.  - escitalopram (LEXAPRO) 10 MG tablet; Take 1 tablet (10 mg) by mouth daily    Dog bite of right hand, subsequent encounter  Dog bite of  right hand approximately 1-1/2 weeks ago, was seen in the ER when out of town.  Covered with antibiotics at that time.  Still has some mild erythema and a hard lump or bite was.  Patient denies any warmth or drainage.  Recommend continued monitoring and if develops any warmth, increased redness, increased pain or fever to be seen in clinic.    Tobacco abuse disorder  Chronic, discussed and encouraged smoking cessation today.            See Patient Instructions    Return in about 2 weeks (around 11/30/2021) for Recheck.    QUINN Cárdenas CNP  [facility]    Michelle Amor is a 41 year old who presents for the following health issues     HPI     Diabetes Follow-up    How often are you checking your blood sugar? A few times a week  What time of day are you checking your blood sugars (select all that apply)?  Before meals  Have you had any blood sugars above 200?  Yes   Have you had any blood sugars below 70?  No    What symptoms do you notice when your blood sugar is low?  None    What concerns do you have today about your diabetes? Blood sugar is often over 200 and Other: not feeling well when blood sugars are in normal range     Do you have any of these symptoms? (Select all that apply)  Blurry vision    Have you had a diabetic eye exam in the last 12 months? No                Hyperlipidemia Follow-Up      Are you regularly taking any medication or supplement to lower your cholesterol?   Yes- atorvastatin    Are you having muscle aches or other side effects that you think could be caused by your cholesterol lowering medication?  No    Hypertension Follow-up      Do you check your blood pressure regularly outside of the clinic? No     Are you following a low salt diet? No    Are your blood pressures ever more than 140 on the top number (systolic) OR more   than 90 on the bottom number (diastolic), for example 140/90? No, unknown    BP Readings from Last 2 Encounters:   09/25/18  154/86   09/07/18 (!) 160/98     Hemoglobin A1C (%)   Date Value   09/09/2019 8.2 (H)   09/25/2018 7.9 (H)     LDL Cholesterol Calculated (mg/dL)   Date Value   09/09/2019 141.2 (H)   09/25/2018 143 (H)       Depression and Anxiety Follow-Up    How are you doing with your depression since your last visit? Improved     How are you doing with your anxiety since your last visit?  Improved     Are you having other symptoms that might be associated with depression or anxiety? No    Have you had a significant life event? Health Concerns     Do you have any concerns with your use of alcohol or other drugs? No    Social History     Tobacco Use     Smoking status: Current Every Day Smoker     Packs/day: 1.00     Types: Cigarettes     Smokeless tobacco: Current User   Vaping Use     Vaping Use: Never used   Substance Use Topics     Alcohol use: Yes     Comment: very rare     Drug use: No     PHQ 9/25/2018 4/6/2021 11/16/2021   PHQ-9 Total Score 9 0 2   Q9: Thoughts of better off dead/self-harm past 2 weeks Not at all Not at all Not at all     MAX-7 SCORE 9/25/2018 4/6/2021 11/16/2021   Total Score 6 (mild anxiety) - -   Total Score 6 4 0     Last PHQ-9 11/16/2021   1.  Little interest or pleasure in doing things 0   2.  Feeling down, depressed, or hopeless 0   3.  Trouble falling or staying asleep, or sleeping too much 2   4.  Feeling tired or having little energy 0   5.  Poor appetite or overeating 0   6.  Feeling bad about yourself 0   7.  Trouble concentrating 0   8.  Moving slowly or restless 0   Q9: Thoughts of better off dead/self-harm past 2 weeks 0   PHQ-9 Total Score 2   Difficulty at work, home, or with people Not difficult at all     MAX-7  11/16/2021   1. Feeling nervous, anxious, or on edge 0   2. Not being able to stop or control worrying 0   3. Worrying too much about different things 0   4. Trouble relaxing 0   5. Being so restless that it is hard to sit still 0   6. Becoming easily annoyed or irritable 0   7.  Feeling afraid, as if something awful might happen 0   MAX-7 Total Score 0   If you checked any problems, how difficult have they made it for you to do your work, take care of things at home, or get along with other people? Not difficult at all       Suicide Assessment Five-step Evaluation and Treatment (SAFE-T)        How many servings of fruits and vegetables do you eat daily?  0-1    On average, how many sweetened beverages do you drink each day (Examples: soda, juice, sweet tea, etc.  Do NOT count diet or artificially sweetened beverages)?   0    How many days per week do you exercise enough to make your heart beat faster? 7    How many minutes a day do you exercise enough to make your heart beat faster? 30 - 60  How many days per week do you miss taking your medication? Doesn't miss taking it unless he's out of meds. 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    What makes it hard for you to take your medications?  remembering to take      Review of Systems   Constitutional, HEENT, cardiovascular, pulmonary, gi and gu systems are negative, except as otherwise noted.      Objective           Vitals:  No vitals were obtained today due to virtual visit.    Physical Exam   GENERAL: Healthy, alert and no distress  EYES: Eyes grossly normal to inspection.  No discharge or erythema, or obvious scleral/conjunctival abnormalities.  RESP: No audible wheeze, cough, or visible cyanosis.  No visible retractions or increased work of breathing.    SKIN: 2 scabbing puncture sites on top of right hand with mild erythema noted, approximately 2 cm x 2 cm    NEURO: Cranial nerves grossly intact.  Mentation and speech appropriate for age.  PSYCH: Mentation appears normal, affect normal/bright, judgement and insight intact, normal speech and appearance well-groomed.    Diagnostic Test Results:  none            Video-Visit Details    Type of service:  Video Visit    Video End Time:10:24  AM    Originating Location (pt. Location): Home    Distant Location (provider location):  [facility]     Platform used for Video Visit: [employer]     Chart documentation with Dragon Voice recognition Software. Although reviewed after completion, some words and grammatical errors may remain.

## 2021-11-17 ENCOUNTER — TELEPHONE (OUTPATIENT)
Dept: FAMILY MEDICINE | Facility: CLINIC | Age: 41
End: 2021-11-17
Payer: COMMERCIAL

## 2021-11-17 ASSESSMENT — PATIENT HEALTH QUESTIONNAIRE - PHQ9: SUM OF ALL RESPONSES TO PHQ QUESTIONS 1-9: 2

## 2021-11-17 ASSESSMENT — ANXIETY QUESTIONNAIRES: GAD7 TOTAL SCORE: 0

## 2021-11-17 NOTE — TELEPHONE ENCOUNTER
Diabetes Education Scheduling Outreach #1:    Call to patient to schedule. Left message with phone number to call to schedule.    Plan for 2nd outreach attempt within 1 week.    Kayleigh Orellana  [facility]  Diabetes and Nutrition Scheduling

## 2021-11-23 NOTE — TELEPHONE ENCOUNTER
Diabetes Education Scheduling Outreach #2:    Call to patient to schedule. Left message with phone number to call to schedule.    Kayleigh Orellana  [facility]  Diabetes and Nutrition Scheduling

## 2021-12-21 ENCOUNTER — TELEPHONE (OUTPATIENT)
Dept: FAMILY MEDICINE | Facility: CLINIC | Age: 41
End: 2021-12-21
Payer: COMMERCIAL

## 2021-12-21 NOTE — LETTER
[facility]  [address] [place] [address], [place] [address]  [phone]        [facility]                                                               Date: 12/21/2021  [address] [facility] [address]      Dear Mt,    In order to ensure we are providing the best quality care, we have reviewed your chart and see that you are due for:  1.   Fasting labs.  2.   Nurse only blood pressure check On review of your electronic health record, your last blood pressure check was elevated.   BP Readings from Last 3 Encounters:   09/25/18 154/86   09/07/18 (!) 160/98   12/29/17 (!) 160/96         Please call the clinic at your earliest convenience to schedule a lab and nurse appointment.    We greatly appreciate the opportunity to serve you. Thank you for trusting us with your health care.      Sincerely,    Your care team at [facility]    Swati BERNAL/ashley

## 2021-12-21 NOTE — TELEPHONE ENCOUNTER
Patient Quality Outreach      Summary:    Patient has the following on his problem list/HM:   Depression / Dysthymia review    6 Month Remission: 4-8 month window range: unknown  12 Month Remission: 10-14 month window range: unknown       PHQ-9 SCORE 9/25/2018 4/6/2021 11/16/2021   PHQ-9 Total Score MyChart 9 (Mild depression) - -   PHQ-9 Total Score 9 0 2       If PHQ-9 recheck is 5 or more, route to provider for next steps.    Diabetes    Last A1C:  Lab Results   Component Value Date    A1C 8.2 09/09/2019    A1C 7.9 09/25/2018       Last LDL:    Lab Results   Component Value Date    .2 09/09/2019       Is the patient on a Statin? Yes          Is the patient on Aspirin? Excluded    Medications     HMG CoA Reductase Inhibitors     atorvastatin (LIPITOR) 10 MG tablet       Salicylates     ASPIRIN NOT PRESCRIBED (INTENTIONAL)             Last three blood pressure readings:  BP Readings from Last 3 Encounters:   09/25/18 154/86   09/07/18 (!) 160/98   12/29/17 (!) 160/96          Tobacco Use      Smoking status: Current Every Day Smoker        Packs/day: 1.00        Types: Cigarettes      Smokeless tobacco: Current User          Patient is due/failing the following:   Diabetes -  A1C and LDL (Fasting)  Hypertension -  BP check  Depression  - 0    Type of outreach:    Sent letter.    Questions for provider review:    None                                                                                                                                     Amanda ARGUETA CMA       Chart routed to Care Team.

## 2021-12-28 ENCOUNTER — VIRTUAL VISIT (OUTPATIENT)
Dept: FAMILY MEDICINE | Facility: CLINIC | Age: 41
End: 2021-12-28
Payer: COMMERCIAL

## 2021-12-28 DIAGNOSIS — F33.0 MILD EPISODE OF RECURRENT MAJOR DEPRESSIVE DISORDER (H): ICD-10-CM

## 2021-12-28 DIAGNOSIS — E11.9 TYPE 2 DIABETES MELLITUS WITHOUT COMPLICATION, WITHOUT LONG-TERM CURRENT USE OF INSULIN (H): ICD-10-CM

## 2021-12-28 DIAGNOSIS — I10 BENIGN ESSENTIAL HYPERTENSION: ICD-10-CM

## 2021-12-28 PROCEDURE — 99214 OFFICE O/P EST MOD 30 MIN: CPT | Mod: 95 | Performed by: NURSE PRACTITIONER

## 2021-12-28 RX ORDER — ESCITALOPRAM OXALATE 10 MG/1
10 TABLET ORAL DAILY
Qty: 90 TABLET | Refills: 1 | Status: SHIPPED | OUTPATIENT
Start: 2021-12-28 | End: 2022-05-23

## 2021-12-28 RX ORDER — METFORMIN HYDROCHLORIDE 750 MG/1
750 TABLET, EXTENDED RELEASE ORAL 2 TIMES DAILY WITH MEALS
Qty: 180 TABLET | Refills: 1 | Status: SHIPPED | OUTPATIENT
Start: 2021-12-28 | End: 2022-05-23

## 2021-12-28 RX ORDER — LISINOPRIL 20 MG/1
20 TABLET ORAL DAILY
Qty: 90 TABLET | Refills: 1 | Status: SHIPPED | OUTPATIENT
Start: 2021-12-28 | End: 2022-05-23

## 2021-12-28 NOTE — PROGRESS NOTES
Mt is a 41 year old who is being evaluated via a billable video visit.      How would you like to obtain your AVS? MyChart  If the video visit is dropped, the invitation should be resent by: Text to cell phone: [phone]  Will anyone else be joining your video visit? No      Video Start Time: 10:00 AM    Assessment & Plan     Type 2 diabetes mellitus without complication, without long-term current use of insulin (H)  Chronic, uncontrolled. Has not had recent A1c, blood sugars often > 200. Did not increase Metformin to two times daily from last office visit, just realized and did this week. Has some current stress. Patient scheduled for lab tomorrow, advised to come fasting. Continue increased Metformin dose, will call with lab results and follow up in 3 months face to face.   - metFORMIN (GLUCOPHAGE-XR) 750 MG 24 hr tablet; Take 1 tablet (750 mg) by mouth 2 times daily (with meals)    Benign essential hypertension  Chronic, stable. No changes.   - lisinopril (ZESTRIL) 20 MG tablet; Take 1 tablet (20 mg) by mouth daily    Mild episode of recurrent major depressive disorder (H)  Chronic, stable. No changes.   - escitalopram (LEXAPRO) 10 MG tablet; Take 1 tablet (10 mg) by mouth daily           See Patient Instructions    Return in about 3 months (around 3/28/2022) for Recheck.    Swati Villalpando, DNP, APRN-CNP   [facility]    Subjective   Mt is a 41 year old who presents for the following health issues     HPI     Diabetes Follow-up    How often are you checking your blood sugar? One time daily  What time of day are you checking your blood sugars (select all that apply)?  Before meals  Have you had any blood sugars above 200?  Yes   Have you had any blood sugars below 70?  No    What symptoms do you notice when your blood sugar is low?  None    What concerns do you have today about your diabetes? Blood sugar is often over 200     Do you have any of these symptoms? (Select all  that apply)  No numbness or tingling in feet.  No redness, sores or blisters on feet.  No complaints of excessive thirst.  No reports of blurry vision.  No significant changes to weight.    Have you had a diabetic eye exam in the last 12 months? No    Has only been taking Metformin once daily   Started taking two times daily this week     BP Readings from Last 2 Encounters:   09/25/18 154/86   09/07/18 (!) 160/98     Hemoglobin A1C POCT (%)   Date Value   09/09/2019 8.2 (H)   09/25/2018 7.9 (H)     LDL Cholesterol Calculated (mg/dL)   Date Value   09/09/2019 141.2 (H)   09/25/2018 143 (H)                     Review of Systems   Constitutional, HEENT, cardiovascular, pulmonary, gi and gu systems are negative, except as otherwise noted.      Objective           Vitals:  No vitals were obtained today due to virtual visit.    Physical Exam   GENERAL: Healthy, alert and no distress  EYES: Eyes grossly normal to inspection.  No discharge or erythema, or obvious scleral/conjunctival abnormalities.  RESP: No audible wheeze, cough, or visible cyanosis.  No visible retractions or increased work of breathing.    SKIN: Visible skin clear. No significant rash, abnormal pigmentation or lesions.  NEURO: Cranial nerves grossly intact.  Mentation and speech appropriate for age.  PSYCH: Mentation appears normal, affect normal/bright, judgement and insight intact, normal speech and appearance well-groomed.    Diagnostic Test Results:  Pending            Video-Visit Details    Type of service:  Video Visit    Video End Time:10:26 AM    Originating Location (pt. Location): Home    Distant Location (provider location):  [facility]     Platform used for Video Visit: [employer]

## 2021-12-28 NOTE — PATIENT INSTRUCTIONS
Type 2 diabetes mellitus without complication, without long-term current use of insulin (H)  Chronic, uncontrolled. Has not had recent A1c, blood sugars often > 200. Did not increase Metformin to two times daily from last office visit, just realized and did this week. Has some current stress. Patient scheduled for lab tomorrow, advised to come fasting. Continue increased Metformin dose, will call with lab results and follow up in 3 months face to face.   - metFORMIN (GLUCOPHAGE-XR) 750 MG 24 hr tablet; Take 1 tablet (750 mg) by mouth 2 times daily (with meals)    Benign essential hypertension  Chronic, stable. No changes.   - lisinopril (ZESTRIL) 20 MG tablet; Take 1 tablet (20 mg) by mouth daily    Mild episode of recurrent major depressive disorder (H)  Chronic, stable. No changes.   - escitalopram (LEXAPRO) 10 MG tablet; Take 1 tablet (10 mg) by mouth daily

## 2022-01-12 ENCOUNTER — LAB (OUTPATIENT)
Dept: LAB | Facility: CLINIC | Age: 42
End: 2022-01-12
Payer: COMMERCIAL

## 2022-01-12 DIAGNOSIS — E11.9 TYPE 2 DIABETES MELLITUS WITHOUT COMPLICATION, WITHOUT LONG-TERM CURRENT USE OF INSULIN (H): ICD-10-CM

## 2022-01-12 DIAGNOSIS — I10 BENIGN ESSENTIAL HYPERTENSION: ICD-10-CM

## 2022-01-12 DIAGNOSIS — Z13.220 SCREENING FOR HYPERLIPIDEMIA: ICD-10-CM

## 2022-01-12 LAB
ANION GAP SERPL CALCULATED.3IONS-SCNC: 8 MMOL/L (ref 3–14)
BUN SERPL-MCNC: 18 MG/DL (ref 7–30)
CALCIUM SERPL-MCNC: 9.1 MG/DL (ref 8.5–10.1)
CHLORIDE BLD-SCNC: 101 MMOL/L (ref 94–109)
CHOLEST SERPL-MCNC: 218 MG/DL
CO2 SERPL-SCNC: 25 MMOL/L (ref 20–32)
CREAT SERPL-MCNC: 0.7 MG/DL (ref 0.66–1.25)
CREAT UR-MCNC: 223 MG/DL
FASTING STATUS PATIENT QL REPORTED: YES
GFR SERPL CREATININE-BSD FRML MDRD: >90 ML/MIN/1.73M2
GLUCOSE BLD-MCNC: 261 MG/DL (ref 70–99)
HBA1C MFR BLD: 10.3 % (ref 0–5.6)
HDLC SERPL-MCNC: 32 MG/DL
LDLC SERPL CALC-MCNC: 129 MG/DL
MICROALBUMIN UR-MCNC: 25 MG/L
MICROALBUMIN/CREAT UR: 11.21 MG/G CR (ref 0–17)
NONHDLC SERPL-MCNC: 186 MG/DL
POTASSIUM BLD-SCNC: 4.3 MMOL/L (ref 3.4–5.3)
SODIUM SERPL-SCNC: 134 MMOL/L (ref 133–144)
TRIGL SERPL-MCNC: 284 MG/DL

## 2022-01-12 PROCEDURE — 82043 UR ALBUMIN QUANTITATIVE: CPT

## 2022-01-12 PROCEDURE — 36415 COLL VENOUS BLD VENIPUNCTURE: CPT

## 2022-01-12 PROCEDURE — 83036 HEMOGLOBIN GLYCOSYLATED A1C: CPT

## 2022-01-12 PROCEDURE — 80048 BASIC METABOLIC PNL TOTAL CA: CPT

## 2022-01-12 PROCEDURE — 80061 LIPID PANEL: CPT

## 2022-01-15 ENCOUNTER — HEALTH MAINTENANCE LETTER (OUTPATIENT)
Age: 42
End: 2022-01-15

## 2022-02-02 ENCOUNTER — MYC MEDICAL ADVICE (OUTPATIENT)
Dept: FAMILY MEDICINE | Facility: CLINIC | Age: 42
End: 2022-02-02

## 2022-02-02 ENCOUNTER — TELEPHONE (OUTPATIENT)
Dept: FAMILY MEDICINE | Facility: CLINIC | Age: 42
End: 2022-02-02
Payer: COMMERCIAL

## 2022-02-02 NOTE — TELEPHONE ENCOUNTER
Patient Quality Outreach    Patient is due for the following:   Hypertension -  BP check    NEXT STEPS:   Schedule a nurse only visit for BP check    Type of outreach:    Sent [employer] message.      Questions for provider review:    None     Elsy Barnhart CMA  Chart routed to Care Team.

## 2022-02-21 ENCOUNTER — TELEPHONE (OUTPATIENT)
Dept: FAMILY MEDICINE | Facility: CLINIC | Age: 42
End: 2022-02-21
Payer: COMMERCIAL

## 2022-02-21 NOTE — TELEPHONE ENCOUNTER
Reason for Call:  Other     Detailed comments: Pt says he sees Swati Villalpando. He is asking for a doctor note.. He is currently serving 26 days in the workhouse for a DUI. He gets out during the day to go to work. He has been there 5 days and can hardly walk due to the hard surface. He is asking for a note that says he needs multiple mattress and pillows to help keep pressure off his spine and hips due to sciatica. He is aware that Swati is out of the office until Wednesday so is asking if another provider could write this.   Once letter is done, please send to his email:[email or web address]      Phone Number Patient can be reached at: Home number on file [phone] (home)    Best Time: anytime    Can we leave a detailed message on this number? YES    Call taken on 2/21/2022 at 9:37 AM by Tana Baum

## 2022-02-23 NOTE — TELEPHONE ENCOUNTER
Unfortunately I have not seen patient for this condition, I am unable to provide a written letter.  Please advise patient and have him follow-up after if not improving or worsening.    Thanks,  Swati Villalpando DNP, APRN-CNP

## 2022-03-02 LAB — RETINOPATHY: NEGATIVE

## 2022-03-08 ENCOUNTER — TRANSFERRED RECORDS (OUTPATIENT)
Dept: HEALTH INFORMATION MANAGEMENT | Facility: CLINIC | Age: 42
End: 2022-03-08
Payer: COMMERCIAL

## 2022-03-08 LAB — RETINOPATHY: NEGATIVE

## 2022-05-23 ENCOUNTER — OFFICE VISIT (OUTPATIENT)
Dept: FAMILY MEDICINE | Facility: CLINIC | Age: 42
End: 2022-05-23
Payer: COMMERCIAL

## 2022-05-23 VITALS
WEIGHT: 283 LBS | SYSTOLIC BLOOD PRESSURE: 146 MMHG | RESPIRATION RATE: 12 BRPM | DIASTOLIC BLOOD PRESSURE: 98 MMHG | BODY MASS INDEX: 38.38 KG/M2 | HEART RATE: 80 BPM | TEMPERATURE: 97 F

## 2022-05-23 DIAGNOSIS — I10 BENIGN ESSENTIAL HYPERTENSION: ICD-10-CM

## 2022-05-23 DIAGNOSIS — E78.5 HYPERLIPIDEMIA LDL GOAL <130: ICD-10-CM

## 2022-05-23 DIAGNOSIS — F33.0 MILD EPISODE OF RECURRENT MAJOR DEPRESSIVE DISORDER (H): ICD-10-CM

## 2022-05-23 DIAGNOSIS — M54.42 ACUTE BILATERAL LOW BACK PAIN WITH LEFT-SIDED SCIATICA: Primary | ICD-10-CM

## 2022-05-23 DIAGNOSIS — E66.01 MORBID OBESITY (H): ICD-10-CM

## 2022-05-23 DIAGNOSIS — E11.9 TYPE 2 DIABETES MELLITUS WITHOUT COMPLICATION, WITHOUT LONG-TERM CURRENT USE OF INSULIN (H): ICD-10-CM

## 2022-05-23 PROBLEM — F10.20 ALCOHOL USE DISORDER, SEVERE, DEPENDENCE (H): Status: ACTIVE | Noted: 2022-05-23

## 2022-05-23 PROBLEM — F10.20 ALCOHOL USE DISORDER, SEVERE, DEPENDENCE (H): Status: RESOLVED | Noted: 2022-05-23 | Resolved: 2022-05-23

## 2022-05-23 PROCEDURE — 99207 PR FOOT EXAM NO CHARGE: CPT | Performed by: NURSE PRACTITIONER

## 2022-05-23 PROCEDURE — 99214 OFFICE O/P EST MOD 30 MIN: CPT | Performed by: NURSE PRACTITIONER

## 2022-05-23 RX ORDER — METHYLPREDNISOLONE 4 MG
TABLET, DOSE PACK ORAL
Qty: 21 TABLET | Refills: 0 | Status: CANCELLED | OUTPATIENT
Start: 2022-05-23

## 2022-05-23 RX ORDER — METHYLPREDNISOLONE 4 MG
TABLET, DOSE PACK ORAL
Qty: 21 TABLET | Refills: 0 | Status: SHIPPED | OUTPATIENT
Start: 2022-05-23 | End: 2023-06-23

## 2022-05-23 RX ORDER — LISINOPRIL 40 MG/1
40 TABLET ORAL DAILY
Qty: 90 TABLET | Refills: 1 | Status: SHIPPED | OUTPATIENT
Start: 2022-05-23 | End: 2023-01-03

## 2022-05-23 RX ORDER — CYCLOBENZAPRINE HCL 10 MG
10 TABLET ORAL 3 TIMES DAILY PRN
Qty: 30 TABLET | Refills: 0 | Status: SHIPPED | OUTPATIENT
Start: 2022-05-23 | End: 2023-02-17

## 2022-05-23 RX ORDER — LISINOPRIL 20 MG/1
20 TABLET ORAL DAILY
Qty: 90 TABLET | Refills: 1 | Status: SHIPPED | OUTPATIENT
Start: 2022-05-23 | End: 2022-05-23

## 2022-05-23 RX ORDER — SIMVASTATIN 40 MG
40 TABLET ORAL AT BEDTIME
Qty: 90 TABLET | Refills: 1 | Status: SHIPPED | OUTPATIENT
Start: 2022-05-23 | End: 2023-01-03

## 2022-05-23 RX ORDER — METFORMIN HYDROCHLORIDE 750 MG/1
1500 TABLET, EXTENDED RELEASE ORAL
Qty: 180 TABLET | Refills: 1 | Status: SHIPPED | OUTPATIENT
Start: 2022-05-23 | End: 2022-11-29

## 2022-05-23 RX ORDER — CYCLOBENZAPRINE HCL 10 MG
10 TABLET ORAL 3 TIMES DAILY PRN
Qty: 30 TABLET | Refills: 0 | Status: CANCELLED | OUTPATIENT
Start: 2022-05-23

## 2022-05-23 RX ORDER — ESCITALOPRAM OXALATE 10 MG/1
10 TABLET ORAL DAILY
Qty: 90 TABLET | Refills: 1 | Status: SHIPPED | OUTPATIENT
Start: 2022-05-23 | End: 2023-01-09

## 2022-05-23 ASSESSMENT — PATIENT HEALTH QUESTIONNAIRE - PHQ9
SUM OF ALL RESPONSES TO PHQ QUESTIONS 1-9: 2
10. IF YOU CHECKED OFF ANY PROBLEMS, HOW DIFFICULT HAVE THESE PROBLEMS MADE IT FOR YOU TO DO YOUR WORK, TAKE CARE OF THINGS AT HOME, OR GET ALONG WITH OTHER PEOPLE: NOT DIFFICULT AT ALL
SUM OF ALL RESPONSES TO PHQ QUESTIONS 1-9: 2

## 2022-05-23 ASSESSMENT — PAIN SCALES - GENERAL: PAINLEVEL: EXTREME PAIN (8)

## 2022-05-23 ASSESSMENT — ENCOUNTER SYMPTOMS: BACK PAIN: 1

## 2022-05-23 NOTE — NURSING NOTE
Chief Complaint   Patient presents with     Back Pain     BP (!) 156/102   Pulse 80   Temp 97  F (36.1  C) (Tympanic)   Resp 12   Wt 128.4 kg (283 lb)   BMI 38.38 kg/m   Estimated body mass index is 38.38 kg/m  as calculated from the following:    Height as of 9/25/18: 1.829 m (6').    Weight as of this encounter: 128.4 kg (283 lb).  Patient presents to the clinic using No DME      Health Maintenance that is potentially due pending provider review:    Health Maintenance Due   Topic Date Due     PREVENTIVE CARE VISIT  Never done     DIABETIC FOOT EXAM  Never done     ANNUAL REVIEW OF HM ORDERS  Never done     ADVANCE CARE PLANNING  Never done     COVID-19 Vaccine (1) Never done     Pneumococcal Vaccine: Pediatrics (0 to 5 Years) and At-Risk Patients (6 to 64 Years) (1 - PCV) Never done     HIV SCREENING  Never done     HEPATITIS C SCREENING  Never done     HEPATITIS B IMMUNIZATION (1 of 3 - Risk 3-dose series) Never done        pended.

## 2022-05-23 NOTE — PROGRESS NOTES
Assessment & Plan     Acute bilateral low back pain with left-sided sciatica  Acute, no history of back pain previously.  Has not had any particular trauma or injury to precipitate symptoms.  Does have radiation through left buttock and left thigh intermittently.  Denies any weakness or tingling.  Does not intermittent numbness in left thigh.  Denies any loss of bowel or bladder control and no saddle paresthesias.  No neurological red flags on examination.  Has seen the chiropractor several times without improvement.  Will get x-ray of the lumbar spine today and notify patient of results and any further recommendations, will have patient complete a short course of steroids as well as muscle relaxer over the next week.  Did advise that blood sugars may elevate due to increased steroids and to monitor.  Encouraged to continue heat to low back and stretching.  If symptoms are not improving or worsening in the next week, patient to notify provider and will likely proceed with MRI and/or physical therapy.  - XR Lumbar Spine 2/3 Views; Future  - methylPREDNISolone (MEDROL DOSEPAK) 4 MG tablet therapy pack; Follow package instructions  - cyclobenzaprine (FLEXERIL) 10 MG tablet; Take 1 tablet (10 mg) by mouth 3 times daily as needed for muscle spasms    Type 2 diabetes mellitus without complication, without long-term current use of insulin (H)  Chronic, uncontrolled.  Patient having difficulty remembering evening dose of medication.  Has not been checking blood sugars at home.  Is on the road a lot so tends to forget.  Discussed taking metformin all in the morning instead of splitting dosage to improve compliance.  Advised patient to start checking blood sugars at least twice daily and reporting to provider via MyChart to be able to make further adjustments.  Patient will be out of town for the next 6 months, it is advised that he try to find a clinic in [place] where he will be to get an A1c drawn in approximately 3  months and if not to schedule lab only visit when he does come home.  Patient is agreeable to this.  - FOOT EXAM  - metFORMIN (GLUCOPHAGE-XR) 750 MG 24 hr tablet; Take 2 tablets (1,500 mg) by mouth daily before breakfast    Benign essential hypertension  Chronic, uncontrolled.  Blood pressure above target goal today.  Recommend increasing lisinopril to 40 mg daily and will have patient recheck somewhere when he is out of town and report via [employer].  - lisinopril (ZESTRIL) 40 MG tablet; Take 1 tablet (40 mg) by mouth daily    Mild episode of recurrent major depressive disorder (H)  Chronic, stable.  No changes.  - escitalopram (LEXAPRO) 10 MG tablet; Take 1 tablet (10 mg) by mouth daily    Morbid obesity (H)  Chronic, continue to encourage increasing daily physical activity and following a healthy, well-balanced diet.  Would recommend Mediterranean diet to also help improve diabetes and cholesterol.    Hyperlipidemia LDL goal <130  Chronic, LDL slightly improved from last check, however HDL is slightly worse.  Triglycerides elevated.  Discussed dietary recommendations in clinic as below.  We will have patient start statin and will recheck fasting labs at next office visit.  - simvastatin (ZOCOR) 40 MG tablet; Take 1 tablet (40 mg) by mouth At Bedtime             Tobacco Cessation:   reports that he has been smoking cigarettes. He has been smoking about 1.00 pack per day. He uses smokeless tobacco.  Tobacco Cessation Action Plan: Information offered: Patient not interested at this time    See Patient Instructions. After discussion with patient, patient verbalizes and agreeable to receive AVS instructions via My Chart, not printed today     No follow-ups on file.    Swati Villalpando, NATHALIA, APRN-CNP   [facility]    Michelle Amor is a 42 year old who presents for the following health issues:    Back Pain     History of Present Illness       Back Pain:  He presents for follow up of back  pain. Patient's back pain is a new problem.    Original cause of back pain: not sure  First noticed back pain: more than 1 month ago  Patient feels back pain: dailyLocation of back pain:  Left lower back, left buttock and left hip  Description of back pain: burning, sharp, shooting and stabbing  Back pain spreads: left buttocks, left thigh and right knee    Since patient first noticed back pain, pain is: gradually worsening  Does back pain interfere with his job:  No  On a scale of 1-10 (10 being the worst), patient describes pain as:  4  What makes back pain worse: lying down  Acupuncture: not tried  Acetaminophen: helpful  Activity or exercise: not helpful  Chiropractor:  Helpful  Cold: not tried  Heat: not tried  Massage: not tried  Muscle relaxants: not tried  NSAIDS: helpful  Opioids: not tried  Physical Therapy: not tried  Rest: not helpful  Steroid Injection: not tried  Stretching: helpful  Surgery: not tried  TENS unit: not tried  Topical pain relievers: not tried  Other healthcare providers patient is seeing for back pain: Chiropractor    He eats 2-3 servings of fruits and vegetables daily.He consumes 0 sweetened beverage(s) daily.He exercises with enough effort to increase his heart rate 10 to 19 minutes per day.  He exercises with enough effort to increase his heart rate 6 days per week. He is missing 2 dose(s) of medications per week.    Today's PHQ-9         PHQ-9 Total Score: 2    PHQ-9 Q9 Thoughts of better off dead/self-harm past 2 weeks :   Not at all    How difficult have these problems made it for you to do your work, take care of things at home, or get along with other people: Not difficult at all     Pt unsure of what he did  No explanation for pain  Both parents had DDD and started at this same age  Numbness left thigh - middle  Right thigh burning sensation on anterior leg  Can't seem to lay on left side at all  Hard to lay on right  Activity helps with back pain  Laying is painful  Has seen  chiropractor 8-10 times; son can twist his leg which twists his spine and has some short term relief         Diabetes Follow-up      How often are you checking your blood sugar? Not at all    What concerns do you have today about your diabetes? None and Other: missing evening medications      Do you have any of these symptoms? (Select all that apply)  No numbness or tingling in feet.  No redness, sores or blisters on feet.  No complaints of excessive thirst.  No reports of blurry vision.  No significant changes to weight.    Having difficulty remembering nighttime Metformin             Hyperlipidemia Follow-Up      Are you regularly taking any medication or supplement to lower your cholesterol?   No Not currently on anything     Abnormal cholesterol levels earlier this year     Hypertension Follow-up      Do you check your blood pressure regularly outside of the clinic? No     Are you following a low salt diet? Yes    Are your blood pressures ever more than 140 on the top number (systolic) OR more   than 90 on the bottom number (diastolic), for example 140/90? Yes - feels like they are high at home though      BP Readings from Last 2 Encounters:   05/23/22 (!) 156/102   09/25/18 154/86     Hemoglobin A1C POCT (%)   Date Value   09/09/2019 8.2 (H)   09/25/2018 7.9 (H)     Hemoglobin A1C (%)   Date Value   01/12/2022 10.3 (H)     LDL Cholesterol Calculated (mg/dL)   Date Value   01/12/2022 129 (H)   09/09/2019 141.2 (H)   09/25/2018 143 (H)           Review of Systems   Musculoskeletal: Positive for back pain.      Constitutional, HEENT, cardiovascular, pulmonary, gi and gu systems are negative, except as otherwise noted.      Objective    BP (!) 156/102   Pulse 80   Temp 97  F (36.1  C) (Tympanic)   Resp 12   Wt 128.4 kg (283 lb)   BMI 38.38 kg/m    Body mass index is 38.38 kg/m .  Physical Exam   GENERAL APPEARANCE: healthy, alert and no distress  RESP: lungs clear to auscultation - no rales, rhonchi or  wheezes  CV: regular rates and rhythm, normal S1 S2, no S3 or S4 and no murmur, click or rub  ABDOMEN: soft, nontender, without hepatosplenomegaly or masses, bowel sounds normal and obese  MS: extremities normal- no gross deformities noted and peripheral pulses normal  ORTHO: Lumber/Thoracic Spine Exam: Tender:  left para lumbar muscles, left sciatic notch  Non-tender:  Remainder non-tender  Range of Motion:  lumbar flexion  full, pain-free, lumbar extension  full, pain-free, left lateral lumbar bending  full, pain-free, right lateral lumbar bending  full, painful, left lateral lumbar rotation  full, pain-free, right lateral lumbar rotation  full, pain-free  Strength:  able to heel walk, able to toe walk, gastrocsoleus   5/5, hamstrings  5/5, quadriceps  5/5, tibialis anterior  5/5, peroneals  5/5  Special tests:  negative straight leg raises  SKIN: no suspicious lesions or rashes  NEURO: Normal strength and tone, mentation intact, speech normal and DTR symmetrically normal in lower extremities  DIABETIC FOOT EXAM: normal DP and PT pulses, no trophic changes or ulcerative lesions and normal sensory exam  PSYCH: mentation appears normal and affect normal/bright    Diagnostic Test Results:  Lumbar xray - pending           Chart documentation with Dragon Voice recognition Software. Although reviewed after completion, some words and grammatical errors may remain.

## 2022-05-23 NOTE — PATIENT INSTRUCTIONS
Acute bilateral low back pain with left-sided sciatica  Acute, no history of back pain previously.  Has not had any particular trauma or injury to precipitate symptoms.  Does have radiation through left buttock and left thigh intermittently.  Denies any weakness or tingling.  Does not intermittent numbness in left thigh.  Denies any loss of bowel or bladder control and no saddle paresthesias.  No neurological red flags on examination.  Has seen the chiropractor several times without improvement.  Will get x-ray of the lumbar spine today and notify patient of results and any further recommendations, will have patient complete a short course of steroids as well as muscle relaxer over the next week.  Did advise that blood sugars may elevate due to increased steroids and to monitor.  Encouraged to continue heat to low back and stretching.  If symptoms are not improving or worsening in the next week, patient to notify provider and will likely proceed with MRI and/or physical therapy.  - XR Lumbar Spine 2/3 Views; Future  - methylPREDNISolone (MEDROL DOSEPAK) 4 MG tablet therapy pack; Follow package instructions  - cyclobenzaprine (FLEXERIL) 10 MG tablet; Take 1 tablet (10 mg) by mouth 3 times daily as needed for muscle spasms    Type 2 diabetes mellitus without complication, without long-term current use of insulin (H)  Chronic, uncontrolled.  Patient having difficulty remembering evening dose of medication.  Has not been checking blood sugars at home.  Is on the road a lot so tends to forget.  Discussed taking metformin all in the morning instead of splitting dosage to improve compliance.  Advised patient to start checking blood sugars at least twice daily and reporting to provider via [employer] to be able to make further adjustments.  Patient will be out of town for the next 6 months, it is advised that he try to find a clinic in [place] where he will be to get an A1c drawn in approximately 3 months and if not to schedule  lab only visit when he does come home.  Patient is agreeable to this.  - FOOT EXAM  - metFORMIN (GLUCOPHAGE-XR) 750 MG 24 hr tablet; Take 2 tablets (1,500 mg) by mouth daily before breakfast    Benign essential hypertension  Chronic, uncontrolled.  Blood pressure above target goal today.  Recommend increasing lisinopril to 40 mg daily and will have patient recheck somewhere when he is out of town and report via [employer].  - lisinopril (ZESTRIL) 40 MG tablet; Take 1 tablet (40 mg) by mouth daily    Mild episode of recurrent major depressive disorder (H)  Chronic, stable.  No changes.  - escitalopram (LEXAPRO) 10 MG tablet; Take 1 tablet (10 mg) by mouth daily    Morbid obesity (H)  Chronic, continue to encourage increasing daily physical activity and following a healthy, well-balanced diet.  Would recommend Mediterranean diet to also help improve diabetes and cholesterol.    Hyperlipidemia LDL goal <130  Chronic, LDL slightly improved from last check, however HDL is slightly worse.  Triglycerides elevated.  Discussed dietary recommendations in clinic as below.  We will have patient start statin and will recheck fasting labs at next office visit.  - simvastatin (ZOCOR) 40 MG tablet; Take 1 tablet (40 mg) by mouth At Bedtime

## 2022-07-06 ENCOUNTER — TELEPHONE (OUTPATIENT)
Dept: FAMILY MEDICINE | Facility: CLINIC | Age: 42
End: 2022-07-06

## 2022-07-06 ENCOUNTER — MYC MEDICAL ADVICE (OUTPATIENT)
Dept: FAMILY MEDICINE | Facility: CLINIC | Age: 42
End: 2022-07-06

## 2022-07-06 DIAGNOSIS — E11.9 TYPE 2 DIABETES MELLITUS WITHOUT COMPLICATION, WITHOUT LONG-TERM CURRENT USE OF INSULIN (H): Primary | ICD-10-CM

## 2022-07-06 NOTE — TELEPHONE ENCOUNTER
Patient Quality Outreach    Patient is due for the following:   Diabetes -  A1C and BP Check    NEXT STEPS:   Schedule a nurse only visit for BP lab only visit for A1c    Type of outreach:    Sent [employer] message.    Next Steps:  Reach out within 90 days via [employer].    Max number of attempts reached: No. Will try again in 90 days if patient still on fail list.    Questions for provider review:    None     Elsy Barnhart, [facility]  Chart routed to Care Team.

## 2022-08-27 ENCOUNTER — HEALTH MAINTENANCE LETTER (OUTPATIENT)
Age: 42
End: 2022-08-27

## 2022-11-29 DIAGNOSIS — E11.9 TYPE 2 DIABETES MELLITUS WITHOUT COMPLICATION, WITHOUT LONG-TERM CURRENT USE OF INSULIN (H): ICD-10-CM

## 2022-11-29 RX ORDER — METFORMIN HYDROCHLORIDE 750 MG/1
TABLET, EXTENDED RELEASE ORAL
Qty: 60 TABLET | Refills: 0 | Status: SHIPPED | OUTPATIENT
Start: 2022-11-29 | End: 2023-02-17

## 2022-11-29 NOTE — TELEPHONE ENCOUNTER
Called patient, spoke with his wife Renay. He ran out of metformin yesterday. Patient due for clinic appt and labs. She will relay the message to him when he comes in.  [place] refill provided per protocol.  Elsy JARA RN

## 2022-12-26 ENCOUNTER — HEALTH MAINTENANCE LETTER (OUTPATIENT)
Age: 42
End: 2022-12-26

## 2022-12-30 DIAGNOSIS — I10 BENIGN ESSENTIAL HYPERTENSION: ICD-10-CM

## 2022-12-30 DIAGNOSIS — E78.5 HYPERLIPIDEMIA LDL GOAL <130: ICD-10-CM

## 2023-01-02 NOTE — TELEPHONE ENCOUNTER
Pt was called to schedule follow up appt from May 2022,, he is still out of town for work but is in [place]. He will call when he is back in the area. Zunilda Hinojosa RN

## 2023-01-03 RX ORDER — LISINOPRIL 40 MG/1
TABLET ORAL
Qty: 90 TABLET | Refills: 0 | Status: SHIPPED | OUTPATIENT
Start: 2023-01-03 | End: 2023-02-17

## 2023-01-03 RX ORDER — SIMVASTATIN 40 MG
TABLET ORAL
Qty: 90 TABLET | Refills: 0 | Status: SHIPPED | OUTPATIENT
Start: 2023-01-03 | End: 2023-02-17

## 2023-01-04 ENCOUNTER — TELEPHONE (OUTPATIENT)
Dept: FAMILY MEDICINE | Facility: CLINIC | Age: 43
End: 2023-01-04

## 2023-01-04 ENCOUNTER — MYC MEDICAL ADVICE (OUTPATIENT)
Dept: FAMILY MEDICINE | Facility: CLINIC | Age: 43
End: 2023-01-04

## 2023-01-04 NOTE — TELEPHONE ENCOUNTER
Patient Quality Outreach    Patient is due for the following:   Diabetes -  Diabetic Follow-Up Visit  phq9  Next Steps:   Schedule a office visit for dm    Type of outreach:    Sent letter. and mychart message      Next Steps:  Reach out within 90 days via [employer].    Max number of attempts reached: No. Will try again in 90 days if patient still on fail list.    Questions for provider review:    None     Elsy Barnhart, [facility]  Chart routed to Care Team.

## 2023-01-04 NOTE — LETTER
[facility]  [address] [place] [id number]  [phone]        M[facility]                                                               Date: 1/4/2023  [address] [facility] [address]      Dear Mt,    In order to ensure we are providing the best quality care, we have reviewed your chart and see that you are due for:  1.   Diabetic exam with labs      Please call the clinic at your earliest convenience to schedule an appointment.    We greatly appreciate the opportunity to serve you. Thank you for trusting us with your health care.      Sincerely,    Your care team at [facility]/ashley

## 2023-01-09 ENCOUNTER — MYC MEDICAL ADVICE (OUTPATIENT)
Dept: FAMILY MEDICINE | Facility: CLINIC | Age: 43
End: 2023-01-09

## 2023-01-09 DIAGNOSIS — F33.0 MILD EPISODE OF RECURRENT MAJOR DEPRESSIVE DISORDER (H): ICD-10-CM

## 2023-01-09 RX ORDER — ESCITALOPRAM OXALATE 10 MG/1
10 TABLET ORAL DAILY
Qty: 30 TABLET | Refills: 0 | Status: SHIPPED | OUTPATIENT
Start: 2023-01-09 | End: 2023-02-20

## 2023-01-09 NOTE — TELEPHONE ENCOUNTER
Glynn Amor, I sent in a 30 day supply to [place], you will need an appointment ASAP when you are back in [place]. Thanks, Viji Scott RN

## 2023-02-17 ENCOUNTER — MYC REFILL (OUTPATIENT)
Dept: FAMILY MEDICINE | Facility: CLINIC | Age: 43
End: 2023-02-17
Payer: COMMERCIAL

## 2023-02-17 DIAGNOSIS — I10 BENIGN ESSENTIAL HYPERTENSION: ICD-10-CM

## 2023-02-17 DIAGNOSIS — E11.9 TYPE 2 DIABETES MELLITUS WITHOUT COMPLICATION, WITHOUT LONG-TERM CURRENT USE OF INSULIN (H): ICD-10-CM

## 2023-02-17 DIAGNOSIS — F33.0 MILD EPISODE OF RECURRENT MAJOR DEPRESSIVE DISORDER (H): ICD-10-CM

## 2023-02-17 DIAGNOSIS — E78.5 HYPERLIPIDEMIA LDL GOAL <130: ICD-10-CM

## 2023-02-17 DIAGNOSIS — M54.42 ACUTE BILATERAL LOW BACK PAIN WITH LEFT-SIDED SCIATICA: ICD-10-CM

## 2023-02-17 RX ORDER — LISINOPRIL 40 MG/1
40 TABLET ORAL DAILY
Qty: 30 TABLET | Refills: 0 | Status: SHIPPED | OUTPATIENT
Start: 2023-02-17 | End: 2023-02-17

## 2023-02-17 NOTE — TELEPHONE ENCOUNTER
Swati Villalpando;    Patient was called, he is in [place] now and needing short refill of the Lisinopril which was sent. Patient is asking to be switched to Lexapro versus generic Escitalopram as he said that brand name worked better for him.      Please advise.      BINDU Tapia

## 2023-02-17 NOTE — TELEPHONE ENCOUNTER
Pt is needing these refilled, is out of both of them right now.    Also wondering if the escitalopram can be switched to Lexapro. Stated he felt more of a difference being on Lexapro instead of being on the generic rx. Wondering if this is possible.    Elvira Valdivia Patient

## 2023-02-20 RX ORDER — ESCITALOPRAM OXALATE 10 MG/1
10 TABLET ORAL DAILY
Qty: 90 TABLET | Refills: 0 | Status: SHIPPED | OUTPATIENT
Start: 2023-02-20 | End: 2023-04-19

## 2023-02-21 RX ORDER — METFORMIN HYDROCHLORIDE 750 MG/1
750 TABLET, EXTENDED RELEASE ORAL 2 TIMES DAILY WITH MEALS
Qty: 60 TABLET | Refills: 0 | Status: SHIPPED | OUTPATIENT
Start: 2023-02-21 | End: 2023-04-19

## 2023-02-21 RX ORDER — SIMVASTATIN 40 MG
40 TABLET ORAL AT BEDTIME
Qty: 30 TABLET | Refills: 0 | Status: SHIPPED | OUTPATIENT
Start: 2023-02-21 | End: 2023-04-19

## 2023-02-21 RX ORDER — ESCITALOPRAM OXALATE 10 MG/1
10 TABLET ORAL DAILY
Qty: 30 TABLET | Refills: 0 | Status: SHIPPED | OUTPATIENT
Start: 2023-02-21 | End: 2023-04-19

## 2023-02-21 RX ORDER — CYCLOBENZAPRINE HCL 10 MG
10 TABLET ORAL 3 TIMES DAILY PRN
Qty: 30 TABLET | Refills: 0 | Status: SHIPPED | OUTPATIENT
Start: 2023-02-21

## 2023-02-21 RX ORDER — LISINOPRIL 40 MG/1
40 TABLET ORAL DAILY
Qty: 30 TABLET | Refills: 0 | Status: SHIPPED | OUTPATIENT
Start: 2023-02-21 | End: 2023-04-19

## 2023-04-16 ENCOUNTER — HEALTH MAINTENANCE LETTER (OUTPATIENT)
Age: 43
End: 2023-04-16

## 2023-04-19 ENCOUNTER — MYC REFILL (OUTPATIENT)
Dept: FAMILY MEDICINE | Facility: CLINIC | Age: 43
End: 2023-04-19
Payer: COMMERCIAL

## 2023-04-19 ENCOUNTER — TELEPHONE (OUTPATIENT)
Dept: FAMILY MEDICINE | Facility: CLINIC | Age: 43
End: 2023-04-19
Payer: COMMERCIAL

## 2023-04-19 DIAGNOSIS — E11.9 TYPE 2 DIABETES MELLITUS WITHOUT COMPLICATION, WITHOUT LONG-TERM CURRENT USE OF INSULIN (H): ICD-10-CM

## 2023-04-19 DIAGNOSIS — E78.5 HYPERLIPIDEMIA LDL GOAL <130: ICD-10-CM

## 2023-04-19 DIAGNOSIS — F33.0 MILD EPISODE OF RECURRENT MAJOR DEPRESSIVE DISORDER (H): ICD-10-CM

## 2023-04-19 DIAGNOSIS — I10 BENIGN ESSENTIAL HYPERTENSION: ICD-10-CM

## 2023-04-19 NOTE — TELEPHONE ENCOUNTER
Patient Quality Outreach    Patient is due for the following:   Diabetes -  BP Check and Diabetic Follow-Up Visit  Depression  -  PHQ-9 needed  Sent phq9 on [employer]  Next Steps:   Schedule a office visit for DM and HTN    Type of outreach:    Sent [employer] message.    Next Steps:  Reach out within 90 days via [employer].    Max number of attempts reached: No. Will try again in 90 days if patient still on fail list.    Questions for provider review:    None     Elsy Barnhart, [facility]  Chart routed to Care Team.

## 2023-04-19 NOTE — TELEPHONE ENCOUNTER
Left message on unidentified voicemail to return call. Patient due for F/U of Diabetes, high cholesterol  and HTN clinic appt and fasting labs.   Elsy JARA RN

## 2023-04-20 NOTE — TELEPHONE ENCOUNTER
Pt said that he is currently in [place] working and won't be back for a couple weeks. Pt said he is not sure even what day.  Pt said that when he returns he will make appt.  Pt is asking if he can have one more month worth until he gets back.  Jennifer Orn Station Sec

## 2023-04-21 RX ORDER — LISINOPRIL 40 MG/1
40 TABLET ORAL DAILY
Qty: 30 TABLET | Refills: 0 | Status: SHIPPED | OUTPATIENT
Start: 2023-04-21 | End: 2023-06-23

## 2023-04-21 RX ORDER — SIMVASTATIN 40 MG
40 TABLET ORAL AT BEDTIME
Qty: 30 TABLET | Refills: 0 | Status: SHIPPED | OUTPATIENT
Start: 2023-04-21 | End: 2023-06-23

## 2023-04-21 RX ORDER — METFORMIN HYDROCHLORIDE 750 MG/1
750 TABLET, EXTENDED RELEASE ORAL 2 TIMES DAILY WITH MEALS
Qty: 60 TABLET | Refills: 0 | Status: SHIPPED | OUTPATIENT
Start: 2023-04-21 | End: 2023-06-05

## 2023-04-21 RX ORDER — ESCITALOPRAM OXALATE 10 MG/1
10 TABLET ORAL DAILY
Qty: 30 TABLET | Refills: 0 | Status: SHIPPED | OUTPATIENT
Start: 2023-04-21 | End: 2023-06-23

## 2023-05-19 ENCOUNTER — LAB (OUTPATIENT)
Dept: LAB | Facility: CLINIC | Age: 43
End: 2023-05-19
Payer: COMMERCIAL

## 2023-05-19 DIAGNOSIS — Z11.59 NEED FOR HEPATITIS C SCREENING TEST: ICD-10-CM

## 2023-05-19 DIAGNOSIS — I10 BENIGN ESSENTIAL HYPERTENSION: ICD-10-CM

## 2023-05-19 DIAGNOSIS — Z11.4 SCREENING FOR HIV (HUMAN IMMUNODEFICIENCY VIRUS): ICD-10-CM

## 2023-05-19 DIAGNOSIS — E11.9 TYPE 2 DIABETES MELLITUS WITHOUT COMPLICATION, WITHOUT LONG-TERM CURRENT USE OF INSULIN (H): ICD-10-CM

## 2023-05-19 LAB
ANION GAP SERPL CALCULATED.3IONS-SCNC: 14 MMOL/L (ref 7–15)
BUN SERPL-MCNC: 16.7 MG/DL (ref 6–20)
CALCIUM SERPL-MCNC: 9 MG/DL (ref 8.6–10)
CHLORIDE SERPL-SCNC: 100 MMOL/L (ref 98–107)
CHOLEST SERPL-MCNC: 163 MG/DL
CREAT SERPL-MCNC: 0.64 MG/DL (ref 0.67–1.17)
DEPRECATED HCO3 PLAS-SCNC: 25 MMOL/L (ref 22–29)
GFR SERPL CREATININE-BSD FRML MDRD: >90 ML/MIN/1.73M2
GLUCOSE SERPL-MCNC: 237 MG/DL (ref 70–99)
HBA1C MFR BLD: 8.3 % (ref 0–5.6)
HDLC SERPL-MCNC: 34 MG/DL
LDLC SERPL CALC-MCNC: 80 MG/DL
NONHDLC SERPL-MCNC: 129 MG/DL
POTASSIUM SERPL-SCNC: 4 MMOL/L (ref 3.4–5.3)
SODIUM SERPL-SCNC: 139 MMOL/L (ref 136–145)
TRIGL SERPL-MCNC: 245 MG/DL

## 2023-05-19 PROCEDURE — 86803 HEPATITIS C AB TEST: CPT

## 2023-05-19 PROCEDURE — 36415 COLL VENOUS BLD VENIPUNCTURE: CPT

## 2023-05-19 PROCEDURE — 83036 HEMOGLOBIN GLYCOSYLATED A1C: CPT

## 2023-05-19 PROCEDURE — 80061 LIPID PANEL: CPT

## 2023-05-19 PROCEDURE — 80048 BASIC METABOLIC PNL TOTAL CA: CPT

## 2023-05-19 PROCEDURE — 87389 HIV-1 AG W/HIV-1&-2 AB AG IA: CPT

## 2023-05-20 LAB
HCV AB SERPL QL IA: NONREACTIVE
HIV 1+2 AB+HIV1 P24 AG SERPL QL IA: NONREACTIVE

## 2023-05-23 ENCOUNTER — VIRTUAL VISIT (OUTPATIENT)
Dept: FAMILY MEDICINE | Facility: CLINIC | Age: 43
End: 2023-05-23
Payer: COMMERCIAL

## 2023-05-23 DIAGNOSIS — E78.5 HYPERLIPIDEMIA LDL GOAL <130: ICD-10-CM

## 2023-05-23 DIAGNOSIS — E11.9 TYPE 2 DIABETES MELLITUS WITHOUT COMPLICATION, WITHOUT LONG-TERM CURRENT USE OF INSULIN (H): Primary | ICD-10-CM

## 2023-05-23 DIAGNOSIS — Z91.199 NO-SHOW FOR APPOINTMENT: ICD-10-CM

## 2023-05-23 DIAGNOSIS — F33.0 MILD EPISODE OF RECURRENT MAJOR DEPRESSIVE DISORDER (H): ICD-10-CM

## 2023-05-23 DIAGNOSIS — I10 BENIGN ESSENTIAL HYPERTENSION: ICD-10-CM

## 2023-05-23 RX ORDER — SIMVASTATIN 40 MG
40 TABLET ORAL AT BEDTIME
Qty: 90 TABLET | Refills: 3 | Status: CANCELLED | OUTPATIENT
Start: 2023-05-23

## 2023-05-23 RX ORDER — LISINOPRIL 40 MG/1
40 TABLET ORAL DAILY
Qty: 90 TABLET | Refills: 3 | Status: CANCELLED | OUTPATIENT
Start: 2023-05-23

## 2023-05-23 RX ORDER — ESCITALOPRAM OXALATE 10 MG/1
10 TABLET ORAL DAILY
Qty: 90 TABLET | Refills: 3 | Status: CANCELLED | OUTPATIENT
Start: 2023-05-23

## 2023-05-23 RX ORDER — METFORMIN HYDROCHLORIDE 750 MG/1
750 TABLET, EXTENDED RELEASE ORAL 2 TIMES DAILY WITH MEALS
Qty: 180 TABLET | Refills: 3 | Status: CANCELLED | OUTPATIENT
Start: 2023-05-23

## 2023-05-23 ASSESSMENT — PATIENT HEALTH QUESTIONNAIRE - PHQ9: SUM OF ALL RESPONSES TO PHQ QUESTIONS 1-9: 0

## 2023-05-23 NOTE — PROGRESS NOTES
Mt is a 43 year old who is being evaluated via a billable video visit.      How would you like to obtain your AVS? MyChart  If the video visit is dropped, the invitation should be resent by: Text to cell phone: [phone]  Will anyone else be joining your video visit? No  {If patient encounters technical issues they should call [phone] :085920}        Assessment & Plan     {Diag Picklist:051001}        {Provider  Link to [facility] Help Grid :947319}     Nicotine/Tobacco Cessation:  He reports that he has been smoking cigarettes. He has been smoking an average of 1 pack per day. He uses smokeless tobacco.  Nicotine/Tobacco Cessation Plan:   {Nicotine/Tobacco Cessation Plan:252872}      See Patient Instructions    Swati Villalpando DNP, APRN-CNP   [facility]   Mt is a 43 year old, presenting for the following health issues:  Diabetes, Hypertension, and Lipids    HPI     Diabetes Follow-up    How often are you checking your blood sugar? A few times a month  What time of day are you checking your blood sugars (select all that apply)?  Not applicable  Have you had any blood sugars above 200?  No  Have you had any blood sugars below 70?  No    What symptoms do you notice when your blood sugar is low?  Shaky, Dizzy, Weak, Lethargy, Blurred vision and Confusion    What concerns do you have today about your diabetes? None     Do you have any of these symptoms? (Select all that apply)  No numbness or tingling in feet.  No redness, sores or blisters on feet.  No complaints of excessive thirst.  No reports of blurry vision.  No significant changes to weight.    Have you had a diabetic eye exam in the last 12 months? Yes- Date of last eye exam: June 2022,  Location: [facility]        {Reference  Diabetes Management Resources  Blood Glucose Log - 3 weeks  Blood Glucose Log with Food and Insulin Record :584362}    Hyperlipidemia Follow-Up      Are you regularly taking  "any medication or supplement to lower your cholesterol?   Yes- Simivastatin    Are you having muscle aches or other side effects that you think could be caused by your cholesterol lowering medication?  No    Hypertension Follow-up      Do you check your blood pressure regularly outside of the clinic? No     Are you following a low salt diet? No Doesn't intentionally watch salt - no added salt    Are your blood pressures ever more than 140 on the top number (systolic) OR more   than 90 on the bottom number (diastolic), for example 140/90?     BP Readings from Last 2 Encounters:   05/23/22 (!) 146/98   09/25/18 154/86     Hemoglobin A1C (%)   Date Value   05/19/2023 8.3 (H)   01/12/2022 10.3 (H)   09/09/2019 8.2 (H)   09/25/2018 7.9 (H)     LDL Cholesterol Calculated (mg/dL)   Date Value   05/19/2023 80   01/12/2022 129 (H)   09/09/2019 141.2 (H)   09/25/2018 143 (H)               Review of Systems   Constitutional, HEENT, cardiovascular, pulmonary, gi and gu systems are negative, except as otherwise noted.      Objective           Vitals:  No vitals were obtained today due to virtual visit.    Physical Exam   {video visit exam brief selected:380737::\"GENERAL: Healthy, alert and no distress\",\"EYES: Eyes grossly normal to inspection.  No discharge or erythema, or obvious scleral/conjunctival abnormalities.\",\"RESP: No audible wheeze, cough, or visible cyanosis.  No visible retractions or increased work of breathing.  \",\"SKIN: Visible skin clear. No significant rash, abnormal pigmentation or lesions.\",\"NEURO: Cranial nerves grossly intact.  Mentation and speech appropriate for age.\",\"PSYCH: Mentation appears normal, affect normal/bright, judgement and insight intact, normal speech and appearance well-groomed.\"}    {Diagnostic Test Results (Optional):674002}    {AMBULATORY ATTESTATION (Optional):932467}        Video-Visit Details    Type of service:  Video Visit   Video Start Time: 11:17 AM  Video End Time:{video visit " start/end time for provider to select:966401}    Originating Location (pt. Location): Home  {PROVIDER LOCATION On-site should be selected for visits conducted from your clinic location or adjoining [facility] hospital, academic office, or other nearby [facility] building. Off-site should be selected for all other provider locations, including home:694066}  Distant Location (provider location):  Off-site  Platform used for Video Visit: [employer]ics    Chart documentation with Dragon Voice recognition Software. Although reviewed after completion, some words and grammatical errors may remain.

## 2023-06-01 ENCOUNTER — TRANSFERRED RECORDS (OUTPATIENT)
Dept: MULTI SPECIALTY CLINIC | Facility: CLINIC | Age: 43
End: 2023-06-01

## 2023-06-01 LAB — RETINOPATHY: NORMAL

## 2023-06-05 DIAGNOSIS — E11.9 TYPE 2 DIABETES MELLITUS WITHOUT COMPLICATION, WITHOUT LONG-TERM CURRENT USE OF INSULIN (H): ICD-10-CM

## 2023-06-05 RX ORDER — METFORMIN HYDROCHLORIDE EXTENDED-RELEASE TABLETS 1000 MG/1
1000 TABLET, FILM COATED, EXTENDED RELEASE ORAL 2 TIMES DAILY WITH MEALS
Qty: 60 TABLET | Refills: 0 | Status: SHIPPED | OUTPATIENT
Start: 2023-06-05 | End: 2023-06-23

## 2023-06-22 ENCOUNTER — MYC MEDICAL ADVICE (OUTPATIENT)
Dept: FAMILY MEDICINE | Facility: CLINIC | Age: 43
End: 2023-06-22
Payer: COMMERCIAL

## 2023-06-22 ENCOUNTER — MYC REFILL (OUTPATIENT)
Dept: FAMILY MEDICINE | Facility: CLINIC | Age: 43
End: 2023-06-22
Payer: COMMERCIAL

## 2023-06-22 DIAGNOSIS — I10 BENIGN ESSENTIAL HYPERTENSION: ICD-10-CM

## 2023-06-22 DIAGNOSIS — F33.0 MILD EPISODE OF RECURRENT MAJOR DEPRESSIVE DISORDER (H): ICD-10-CM

## 2023-06-22 DIAGNOSIS — E78.5 HYPERLIPIDEMIA LDL GOAL <130: ICD-10-CM

## 2023-06-22 DIAGNOSIS — E11.9 TYPE 2 DIABETES MELLITUS WITHOUT COMPLICATION, WITHOUT LONG-TERM CURRENT USE OF INSULIN (H): ICD-10-CM

## 2023-06-22 RX ORDER — LISINOPRIL 40 MG/1
40 TABLET ORAL DAILY
Qty: 30 TABLET | Refills: 0 | OUTPATIENT
Start: 2023-06-22

## 2023-06-22 RX ORDER — LISINOPRIL 40 MG/1
TABLET ORAL
Qty: 90 TABLET | Refills: 0 | OUTPATIENT
Start: 2023-06-22

## 2023-06-22 RX ORDER — SIMVASTATIN 40 MG
40 TABLET ORAL AT BEDTIME
Qty: 30 TABLET | Refills: 0 | OUTPATIENT
Start: 2023-06-22

## 2023-06-22 RX ORDER — ESCITALOPRAM OXALATE 10 MG/1
10 TABLET ORAL DAILY
Qty: 30 TABLET | Refills: 0 | OUTPATIENT
Start: 2023-06-22

## 2023-06-22 RX ORDER — ESCITALOPRAM OXALATE 10 MG/1
TABLET ORAL
Qty: 90 TABLET | Refills: 0 | OUTPATIENT
Start: 2023-06-22

## 2023-06-22 NOTE — TELEPHONE ENCOUNTER
Patient notified needs appointment for further med refills as > 1 year since he was seen. Patient no showed for appointment 5/23/23. Needs in clinic appointment for further refills.     Julie Behrendt RN

## 2023-06-23 RX ORDER — METFORMIN HYDROCHLORIDE EXTENDED-RELEASE TABLETS 1000 MG/1
1000 TABLET, FILM COATED, EXTENDED RELEASE ORAL 2 TIMES DAILY WITH MEALS
Qty: 180 TABLET | Refills: 1 | Status: SHIPPED | OUTPATIENT
Start: 2023-06-23 | End: 2023-06-29

## 2023-06-23 RX ORDER — ESCITALOPRAM OXALATE 10 MG/1
10 TABLET ORAL DAILY
Qty: 90 TABLET | Refills: 3 | Status: SHIPPED | OUTPATIENT
Start: 2023-06-23 | End: 2023-09-05

## 2023-06-23 RX ORDER — SIMVASTATIN 40 MG
40 TABLET ORAL AT BEDTIME
Qty: 90 TABLET | Refills: 3 | Status: SHIPPED | OUTPATIENT
Start: 2023-06-23 | End: 2023-09-05

## 2023-06-23 RX ORDER — LISINOPRIL 40 MG/1
40 TABLET ORAL DAILY
Qty: 90 TABLET | Refills: 1 | Status: SHIPPED | OUTPATIENT
Start: 2023-06-23 | End: 2023-09-05

## 2023-06-26 ENCOUNTER — TELEPHONE (OUTPATIENT)
Dept: FAMILY MEDICINE | Facility: CLINIC | Age: 43
End: 2023-06-26
Payer: COMMERCIAL

## 2023-06-26 DIAGNOSIS — E11.9 TYPE 2 DIABETES MELLITUS WITHOUT COMPLICATION, WITHOUT LONG-TERM CURRENT USE OF INSULIN (H): Primary | ICD-10-CM

## 2023-06-26 NOTE — TELEPHONE ENCOUNTER
Prior Authorization Retail Medication Request    Medication/Dose: metformin ER (OSM) 1000mg  ICD code (if different than what is on RX):     Previously Tried and Failed:  Metformin 750mg  Rationale:  Dose increase    Insurance Name:  Covermymeds [id number]  Insurance ID:          Pharmacy Information (if different than what is on RX)  Name:     Phone:

## 2023-06-28 NOTE — TELEPHONE ENCOUNTER
PA Initiation    Medication: METFORMIN HCL ER (OSM) PO  Insurance Company: [employer] - Phone [phone] Fax [phone]  Pharmacy Filling the Rx: [facility] [address] [place] [address]  Filling Pharmacy Phone: [phone]  Filling Pharmacy Fax:    Start Date: 6/28/2023

## 2023-06-28 NOTE — TELEPHONE ENCOUNTER
PRIOR AUTHORIZATION DENIED    Medication: METFORMIN HCL ER (OSM) PO  Insurance Company: [employer] - Phone [phone] Fax [phone]  Denial Date: 6/28/2023  Denial Rational: Needs to be 500mg ER tablets -2BID  Appeal Information: N/A  Patient Notified: No    Per insurance they only cover the 500mg or 750mg strength tablets. Please switch the prescription.    Metformin ER 500mg Tablet- Take 2 tablets PO twice daily.    Thank You!  Jane Degroot [facility]  Prior Auth Specialist

## 2023-06-29 RX ORDER — METFORMIN HCL 500 MG
1000 TABLET, EXTENDED RELEASE 24 HR ORAL 2 TIMES DAILY WITH MEALS
Qty: 360 TABLET | Refills: 0 | Status: SHIPPED | OUTPATIENT
Start: 2023-06-29 | End: 2023-09-02

## 2023-09-02 ENCOUNTER — MYC REFILL (OUTPATIENT)
Dept: FAMILY MEDICINE | Facility: CLINIC | Age: 43
End: 2023-09-02
Payer: COMMERCIAL

## 2023-09-02 DIAGNOSIS — E78.5 HYPERLIPIDEMIA LDL GOAL <130: ICD-10-CM

## 2023-09-02 DIAGNOSIS — I10 BENIGN ESSENTIAL HYPERTENSION: ICD-10-CM

## 2023-09-02 DIAGNOSIS — F33.0 MILD EPISODE OF RECURRENT MAJOR DEPRESSIVE DISORDER (H): ICD-10-CM

## 2023-09-02 DIAGNOSIS — E11.9 TYPE 2 DIABETES MELLITUS WITHOUT COMPLICATION, WITHOUT LONG-TERM CURRENT USE OF INSULIN (H): ICD-10-CM

## 2023-09-05 ENCOUNTER — TELEPHONE (OUTPATIENT)
Dept: FAMILY MEDICINE | Facility: CLINIC | Age: 43
End: 2023-09-05
Payer: COMMERCIAL

## 2023-09-05 RX ORDER — ESCITALOPRAM OXALATE 10 MG/1
10 TABLET ORAL DAILY
Qty: 90 TABLET | Refills: 0 | Status: SHIPPED | OUTPATIENT
Start: 2023-09-05 | End: 2024-02-21

## 2023-09-05 RX ORDER — METFORMIN HCL 500 MG
1000 TABLET, EXTENDED RELEASE 24 HR ORAL 2 TIMES DAILY WITH MEALS
Qty: 360 TABLET | Refills: 0 | Status: SHIPPED | OUTPATIENT
Start: 2023-09-05 | End: 2024-06-01

## 2023-09-05 RX ORDER — LISINOPRIL 40 MG/1
40 TABLET ORAL DAILY
Qty: 90 TABLET | Refills: 0 | Status: SHIPPED | OUTPATIENT
Start: 2023-09-05 | End: 2024-02-21

## 2023-09-05 RX ORDER — SIMVASTATIN 40 MG
40 TABLET ORAL AT BEDTIME
Qty: 90 TABLET | Refills: 0 | Status: SHIPPED | OUTPATIENT
Start: 2023-09-05 | End: 2024-02-21

## 2023-09-05 NOTE — TELEPHONE ENCOUNTER
Patient calling. Asking for 90 day refill. Patient states he has been out of Metformin since Friday 9/1. Patient states he flew home unexpectedly on Saturday and will be leaving again today, He has a flight at 3pm. [place] shore [place] 2 more [place] and is unable to make an appointment at this time.    Asking for Refill ASAP

## 2023-09-05 NOTE — TELEPHONE ENCOUNTER
"Pt asks if prescriber would consider ordering a continuous glucose monitor for him. Pt says that he's not consistently checking BGs; says that he should be checking 3 times a day but says \"I've probably only checked it 3 times in the last month\". 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 Pt says that he would like to try the [place] 2, preferred pharmacy is [facility] in [place]. Routing to PCP for review; pt says that care team can call and talk to his wife with update re: this request.    Carol Gandhi RN  [facility]  "

## 2023-09-25 NOTE — TELEPHONE ENCOUNTER
Please advise ~ need to discuss at next office visit.     Thanks,  Swati Villalpando, DNP, APRN-CNP

## 2023-11-26 ENCOUNTER — HEALTH MAINTENANCE LETTER (OUTPATIENT)
Age: 43
End: 2023-11-26

## 2023-12-20 ENCOUNTER — TELEPHONE (OUTPATIENT)
Dept: FAMILY MEDICINE | Facility: CLINIC | Age: 43
End: 2023-12-20
Payer: COMMERCIAL

## 2023-12-20 NOTE — TELEPHONE ENCOUNTER
Patient Quality Outreach    Patient is due for the following:   Diabetes -  A1C, Microalbumin, BP Check, and Diabetic Follow-Up Visit  Hypertension -  BP check  Physical Preventive Adult Physical  Depression-phq9  Next Steps:   Schedule a Adult Preventative    Type of outreach:    Sent [employer] message.    Next Steps:  Reach out within 90 days via Phone and [employer].    Max number of attempts reached: No. Will try again in 90 days if patient still on fail list.    Questions for provider review:    None           Elsy Barnhart [facility]  Chart routed to Care Team.

## 2024-02-07 ENCOUNTER — TELEPHONE (OUTPATIENT)
Dept: FAMILY MEDICINE | Facility: CLINIC | Age: 44
End: 2024-02-07

## 2024-02-07 NOTE — TELEPHONE ENCOUNTER
Patient Quality Outreach    Patient is due for the following:   Diabetes -  A1C  Hypertension -  BP check  Depression  -  PHQ-9 needed    Next Steps:   Schedule a office visit for bp and dm    Type of outreach:    Phone, left message for patient/parent to call back. and Sent [employer] message.    Next Steps:  Reach out within 90 days via [employer].    Max number of attempts reached: No. Will try again in 90 days if patient still on fail list.    Questions for provider review:    None           Elsy Barnhart, [facility]  Chart routed to Care Team.

## 2024-02-21 ENCOUNTER — MYC REFILL (OUTPATIENT)
Dept: FAMILY MEDICINE | Facility: CLINIC | Age: 44
End: 2024-02-21
Payer: COMMERCIAL

## 2024-02-21 DIAGNOSIS — I10 BENIGN ESSENTIAL HYPERTENSION: ICD-10-CM

## 2024-02-21 DIAGNOSIS — F33.0 MILD EPISODE OF RECURRENT MAJOR DEPRESSIVE DISORDER (H): ICD-10-CM

## 2024-02-21 DIAGNOSIS — E78.5 HYPERLIPIDEMIA LDL GOAL <130: ICD-10-CM

## 2024-02-21 RX ORDER — LISINOPRIL 40 MG/1
40 TABLET ORAL DAILY
Qty: 90 TABLET | Refills: 0 | Status: SHIPPED | OUTPATIENT
Start: 2024-02-21 | End: 2024-03-15

## 2024-02-21 RX ORDER — SIMVASTATIN 40 MG
40 TABLET ORAL AT BEDTIME
Qty: 90 TABLET | Refills: 0 | Status: SHIPPED | OUTPATIENT
Start: 2024-02-21 | End: 2024-03-15

## 2024-02-21 RX ORDER — ESCITALOPRAM OXALATE 10 MG/1
10 TABLET ORAL DAILY
Qty: 90 TABLET | Refills: 0 | Status: SHIPPED | OUTPATIENT
Start: 2024-02-21 | End: 2024-03-15

## 2024-02-21 NOTE — TELEPHONE ENCOUNTER
"Has appointment scheduled for 3/15/24      Requested Prescriptions   Pending Prescriptions Disp Refills    escitalopram (LEXAPRO) 10 MG tablet 90 tablet 0     Sig: Take 1 tablet (10 mg) by mouth daily BRAND name only       SSRIs Protocol Failed - 2/21/2024 11:28 AM        Failed - PHQ-9 score less than 5 in past 6 months     Please review last PHQ-9 score.           Passed - Medication is active on med list        Passed - Patient is age 18 or older        Passed - Recent (6 mo) or future (30 days) visit within the authorizing provider's specialty     Patient had office visit in the last 6 months or has a visit in the next 30 days with authorizing provider or within the authorizing provider's specialty.  See \"Patient Info\" tab in inbasket, or \"Choose Columns\" in Meds & Orders section of the refill encounter.              simvastatin (ZOCOR) 40 MG tablet 90 tablet 0     Sig: Take 1 tablet (40 mg) by mouth at bedtime       Antihyperlipidemic agents Failed - 2/21/2024 11:28 AM        Failed - Normal serum ALT on record in past 12 mos     Recent Labs   Lab Test 09/09/19  0000   .0*             Passed - Lipid panel on file in past 12 mos     Recent Labs   Lab Test 05/19/23  0905   CHOL 163   TRIG 245*   HDL 34*   LDL 80   NHDL 129               Passed - Recent (12 mo) or future (30 days) visit within the authorizing provider's specialty     The patient must have completed an in-person or virtual visit within the past 12 months or has a future visit scheduled within the next 90 days with the authorizing provider s specialty.  Urgent care and e-visits do not quality as an office visit for this protocol.          Passed - Medication is active on med list        Passed - Patient is age 18 years or older          lisinopril (ZESTRIL) 40 MG tablet 90 tablet 0     Sig: Take 1 tablet (40 mg) by mouth daily       ACE Inhibitors (Including Combos) Protocol Failed - 2/21/2024 11:28 AM        Failed - Blood pressure under " 140/90 in past 12 months     BP Readings from Last 3 Encounters:   05/23/22 (!) 146/98   09/25/18 154/86   09/07/18 (!) 160/98                 Failed - Normal serum creatinine on file in past 12 months     Recent Labs   Lab Test 05/19/23  0905   CR 0.64*       Ok to refill medication if creatinine is low          Passed - Medication is active on med list        Passed - Medication indicated for associated diagnosis     Medication is associated with one or more of the following diagnoses:     Chronic Kidney Disease (CKD)   Coronary Artery Disease (CAD)   Diabetes   Heart Failure (HF)   Hypertension (HTN)   Nephropathy            Passed - Has GFR on file in past 12 months and most recent value is normal        Passed - Recent (12 mo) or future (90 days) visit within the authorizing provider's specialty     The patient must have completed an in-person or virtual visit within the past 12 months or has a future visit scheduled within the next 90 days with the authorizing provider s specialty.  Urgent care and e-visits do not quality as an office visit for this protocol.          Passed - Patient is age 18 or older        Passed - Normal serum potassium on file in past 12 months     Recent Labs   Lab Test 05/19/23 0905   POTASSIUM 4.0

## 2024-03-15 ENCOUNTER — OFFICE VISIT (OUTPATIENT)
Dept: FAMILY MEDICINE | Facility: CLINIC | Age: 44
End: 2024-03-15
Payer: COMMERCIAL

## 2024-03-15 VITALS
RESPIRATION RATE: 24 BRPM | BODY MASS INDEX: 35.87 KG/M2 | DIASTOLIC BLOOD PRESSURE: 110 MMHG | WEIGHT: 264.8 LBS | HEIGHT: 72 IN | TEMPERATURE: 99.1 F | SYSTOLIC BLOOD PRESSURE: 184 MMHG | OXYGEN SATURATION: 99 % | HEART RATE: 98 BPM

## 2024-03-15 DIAGNOSIS — E78.5 HYPERLIPIDEMIA LDL GOAL <130: ICD-10-CM

## 2024-03-15 DIAGNOSIS — I10 BENIGN ESSENTIAL HYPERTENSION: ICD-10-CM

## 2024-03-15 DIAGNOSIS — F33.0 MILD EPISODE OF RECURRENT MAJOR DEPRESSIVE DISORDER (H): ICD-10-CM

## 2024-03-15 DIAGNOSIS — E66.01 MORBID OBESITY (H): ICD-10-CM

## 2024-03-15 DIAGNOSIS — E11.9 TYPE 2 DIABETES MELLITUS WITHOUT COMPLICATION, WITHOUT LONG-TERM CURRENT USE OF INSULIN (H): Primary | ICD-10-CM

## 2024-03-15 PROBLEM — F33.3 SEVERE EPISODE OF RECURRENT MAJOR DEPRESSIVE DISORDER, WITH PSYCHOTIC FEATURES (H): Status: ACTIVE | Noted: 2024-03-15

## 2024-03-15 PROBLEM — F33.3 SEVERE EPISODE OF RECURRENT MAJOR DEPRESSIVE DISORDER, WITH PSYCHOTIC FEATURES (H): Status: RESOLVED | Noted: 2024-03-15 | Resolved: 2024-03-15

## 2024-03-15 LAB
ANION GAP SERPL CALCULATED.3IONS-SCNC: 10 MMOL/L (ref 7–15)
BUN SERPL-MCNC: 13.4 MG/DL (ref 6–20)
CALCIUM SERPL-MCNC: 9.2 MG/DL (ref 8.6–10)
CHLORIDE SERPL-SCNC: 104 MMOL/L (ref 98–107)
CREAT SERPL-MCNC: 0.65 MG/DL (ref 0.67–1.17)
DEPRECATED HCO3 PLAS-SCNC: 27 MMOL/L (ref 22–29)
EGFRCR SERPLBLD CKD-EPI 2021: >90 ML/MIN/1.73M2
GLUCOSE SERPL-MCNC: 211 MG/DL (ref 70–99)
HBA1C MFR BLD: 7.9 % (ref 0–5.6)
POTASSIUM SERPL-SCNC: 4.2 MMOL/L (ref 3.4–5.3)
SODIUM SERPL-SCNC: 141 MMOL/L (ref 135–145)

## 2024-03-15 PROCEDURE — 36415 COLL VENOUS BLD VENIPUNCTURE: CPT | Performed by: NURSE PRACTITIONER

## 2024-03-15 PROCEDURE — 99214 OFFICE O/P EST MOD 30 MIN: CPT | Performed by: NURSE PRACTITIONER

## 2024-03-15 PROCEDURE — 80048 BASIC METABOLIC PNL TOTAL CA: CPT | Performed by: NURSE PRACTITIONER

## 2024-03-15 PROCEDURE — 83036 HEMOGLOBIN GLYCOSYLATED A1C: CPT | Performed by: NURSE PRACTITIONER

## 2024-03-15 RX ORDER — ESCITALOPRAM OXALATE 20 MG/1
20 TABLET ORAL DAILY
Qty: 30 TABLET | Refills: 0 | Status: SHIPPED | OUTPATIENT
Start: 2024-03-15 | End: 2024-04-16

## 2024-03-15 RX ORDER — METFORMIN HCL 500 MG
1000 TABLET, EXTENDED RELEASE 24 HR ORAL 2 TIMES DAILY WITH MEALS
Qty: 360 TABLET | Refills: 3 | Status: CANCELLED | OUTPATIENT
Start: 2024-03-15

## 2024-03-15 RX ORDER — HYDROCHLOROTHIAZIDE 12.5 MG/1
12.5 TABLET ORAL DAILY
Qty: 30 TABLET | Refills: 0 | Status: SHIPPED | OUTPATIENT
Start: 2024-03-15 | End: 2024-04-16

## 2024-03-15 RX ORDER — LISINOPRIL 40 MG/1
40 TABLET ORAL DAILY
Qty: 30 TABLET | Refills: 0 | Status: SHIPPED | OUTPATIENT
Start: 2024-03-15 | End: 2024-06-01

## 2024-03-15 RX ORDER — SIMVASTATIN 40 MG
40 TABLET ORAL AT BEDTIME
Qty: 30 TABLET | Refills: 0 | Status: SHIPPED | OUTPATIENT
Start: 2024-03-15 | End: 2024-06-01

## 2024-03-15 ASSESSMENT — ANXIETY QUESTIONNAIRES
3. WORRYING TOO MUCH ABOUT DIFFERENT THINGS: NOT AT ALL
GAD7 TOTAL SCORE: 0
1. FEELING NERVOUS, ANXIOUS, OR ON EDGE: NOT AT ALL
7. FEELING AFRAID AS IF SOMETHING AWFUL MIGHT HAPPEN: NOT AT ALL
GAD7 TOTAL SCORE: 0
6. BECOMING EASILY ANNOYED OR IRRITABLE: NOT AT ALL
2. NOT BEING ABLE TO STOP OR CONTROL WORRYING: NOT AT ALL
GAD7 TOTAL SCORE: 0
5. BEING SO RESTLESS THAT IT IS HARD TO SIT STILL: NOT AT ALL
8. IF YOU CHECKED OFF ANY PROBLEMS, HOW DIFFICULT HAVE THESE MADE IT FOR YOU TO DO YOUR WORK, TAKE CARE OF THINGS AT HOME, OR GET ALONG WITH OTHER PEOPLE?: NOT DIFFICULT AT ALL
IF YOU CHECKED OFF ANY PROBLEMS ON THIS QUESTIONNAIRE, HOW DIFFICULT HAVE THESE PROBLEMS MADE IT FOR YOU TO DO YOUR WORK, TAKE CARE OF THINGS AT HOME, OR GET ALONG WITH OTHER PEOPLE: NOT DIFFICULT AT ALL
4. TROUBLE RELAXING: NOT AT ALL
7. FEELING AFRAID AS IF SOMETHING AWFUL MIGHT HAPPEN: NOT AT ALL

## 2024-03-15 ASSESSMENT — PATIENT HEALTH QUESTIONNAIRE - PHQ9
SUM OF ALL RESPONSES TO PHQ QUESTIONS 1-9: 0
10. IF YOU CHECKED OFF ANY PROBLEMS, HOW DIFFICULT HAVE THESE PROBLEMS MADE IT FOR YOU TO DO YOUR WORK, TAKE CARE OF THINGS AT HOME, OR GET ALONG WITH OTHER PEOPLE: NOT DIFFICULT AT ALL
SUM OF ALL RESPONSES TO PHQ QUESTIONS 1-9: 0

## 2024-03-15 NOTE — PROGRESS NOTES
Assessment & Plan     Type 2 diabetes mellitus without complication, without long-term current use of insulin (H)  Chronic, has been uncontrolled.  Patient often misses evening dose of metformin, sometimes forgetting meds when he is out of town.  Does not check very often, maybe a few times a month.  Has 750 mg tablets of metformin at home and has been taking 2 of those in the mornings.  This dosage is over the recommended daily dosing, discussed with patient possibly setting alarm to remember to take evening dose of metformin or can take 2 in the morning of the 500 mg.  Will recheck A1c today and determine next steps and follow-up, either 3 or 6 months.   - HEMOGLOBIN A1C; Future    Benign essential hypertension  Chronic, blood pressure well over goal today.  Taking lisinopril daily.  Discussed adding in hydrochlorothiazide in addition to what is currently taking.  Patient agreeable.  Will start low-dose hydrochlorothiazide with nurse only blood pressure check and labs in 2 weeks.  - lisinopril (ZESTRIL) 40 MG tablet; Take 1 tablet (40 mg) by mouth daily  - Basic metabolic panel  (Ca, Cl, CO2, Creat, Gluc, K, Na, BUN); Future  - hydroCHLOROthiazide 12.5 MG tablet; Take 1 tablet (12.5 mg) by mouth daily    Hyperlipidemia LDL goal <130  Chronic, has been on statin, tolerating well.  Continue without any changes.  - simvastatin (ZOCOR) 40 MG tablet; Take 1 tablet (40 mg) by mouth at bedtime    Mild episode of recurrent major depressive disorder (H24)  Chronic, slightly worsened.  Has a significant amount of stress currently.  Discussed increasing dose of escitalopram, patient agreeable.  Will increase to 20 mg.  - escitalopram (LEXAPRO) 20 MG tablet; Take 1 tablet (20 mg) by mouth daily BRAND name only    Morbid obesity (H)  Chronic, no change.  Discussed weight loss, diet exercise.        BMI  Estimated body mass index is 35.91 kg/m  as calculated from the following:    Height as of this encounter: 1.829 m (6').     Weight as of this encounter: 120.1 kg (264 lb 12.8 oz).   Weight management plan: Discussed healthy diet and exercise guidelines      See Patient Instructions    Subjective   Mt is a 44 year old, presenting for the following health issues:  Hypertension, Diabetes, and Depression      3/15/2024    11:07 AM   Additional Questions   Roomed by Amanda     Via the Health Maintenance questionnaire, the patient has reported the following services have been completed -Eye Exam, this information has been sent to the abstraction team.  HPI       Diabetes Follow-up    How often are you checking your blood sugar? A few times a month  What time of day are you checking your blood sugars (select all that apply)?  Before and after meals  Have you had any blood sugars above 200?  Yes   Have you had any blood sugars below 70?  No  What symptoms do you notice when your blood sugar is low?  None  What concerns do you have today about your diabetes? Other: believes blood sugar is bad   Do you have any of these symptoms? (Select all that apply)  No numbness or tingling in feet.  No redness, sores or blisters on feet.  No complaints of excessive thirst.  No reports of blurry vision.  No significant changes to weight.      Has been taking 2 - 750 mg of Metformin in the mornings ~ often forgets the evening dose on the 500     Will be needing refill in April     BP Readings from Last 2 Encounters:   05/23/22 (!) 146/98   09/25/18 154/86     Hemoglobin A1C (%)   Date Value   05/19/2023 8.3 (H)   01/12/2022 10.3 (H)   09/09/2019 8.2 (H)   09/25/2018 7.9 (H)     LDL Cholesterol Calculated (mg/dL)   Date Value   05/19/2023 80   01/12/2022 129 (H)   09/09/2019 141.2 (H)   09/25/2018 143 (H)             Hypertension Follow-up    Do you check your blood pressure regularly outside of the clinic? No   Are you following a low salt diet? No  Are your blood pressures ever more than 140 on the top number (systolic) OR more   than 90 on the bottom number  (diastolic), for example 140/90? No        Depression and Anxiety   How are you doing with your depression since your last visit? No change  How are you doing with your anxiety since your last visit?  No change  Are you having other symptoms that might be associated with depression or anxiety? No  Have you had a significant life event? OTHER: stress in life    Do you have any concerns with your use of alcohol or other drugs? Yes:  occasional alcohol and marijuana gummies for sleep    Social History     Tobacco Use    Smoking status: Every Day     Packs/day: 1     Types: Cigarettes    Smokeless tobacco: Current   Vaping Use    Vaping Use: Never used   Substance Use Topics    Alcohol use: Yes     Comment: very rare    Drug use: No         5/23/2022    11:08 AM 5/23/2023    10:48 AM 3/15/2024    10:53 AM   PHQ   PHQ-9 Total Score 2 0 0   Q9: Thoughts of better off dead/self-harm past 2 weeks Not at all Not at all Not at all         4/6/2021    11:56 AM 11/16/2021     9:28 AM 3/15/2024    10:54 AM   MAX-7 SCORE   Total Score   0 (minimal anxiety)   Total Score 4 0 0         3/15/2024    10:53 AM   Last PHQ-9   1.  Little interest or pleasure in doing things 0   2.  Feeling down, depressed, or hopeless 0   3.  Trouble falling or staying asleep, or sleeping too much 0   4.  Feeling tired or having little energy 0   5.  Poor appetite or overeating 0   6.  Feeling bad about yourself 0   7.  Trouble concentrating 0   8.  Moving slowly or restless 0   Q9: Thoughts of better off dead/self-harm past 2 weeks 0   PHQ-9 Total Score 0         3/15/2024    10:54 AM   MAX-7    1. Feeling nervous, anxious, or on edge 0   2. Not being able to stop or control worrying 0   3. Worrying too much about different things 0   4. Trouble relaxing 0   5. Being so restless that it is hard to sit still 0   6. Becoming easily annoyed or irritable 0   7. Feeling afraid, as if something awful might happen 0   MAX-7 Total Score 0   If you checked any  problems, how difficult have they made it for you to do your work, take care of things at home, or get along with other people? Not difficult at all       Suicide Assessment Five-step Evaluation and Treatment (SAFE-T)    How many servings of fruits and vegetables do you eat daily?  2-3  On average, how many sweetened beverages do you drink each day (Examples: soda, juice, sweet tea, etc.  Do NOT count diet or artificially sweetened beverages)?   0  How many days per week do you exercise enough to make your heart beat faster? 5  How many minutes a day do you exercise enough to make your heart beat faster? 60 or more  Physically active job  How many days per week do you miss taking your medication? 2  What makes it hard for you to take your medications?  remembering to take        Review of Systems  Constitutional, HEENT, cardiovascular, pulmonary, gi and gu systems are negative, except as otherwise noted.      Objective    BP (!) 184/110   Pulse 98   Temp 99.1  F (37.3  C)   Resp 24   Ht 1.829 m (6')   Wt 120.1 kg (264 lb 12.8 oz)   SpO2 99%   BMI 35.91 kg/m    Body mass index is 35.91 kg/m .  Physical Exam   GENERAL: alert and no distress  NECK: no adenopathy, no asymmetry, masses, or scars  RESP: lungs clear to auscultation - no rales, rhonchi or wheezes  CV: regular rate and rhythm, normal S1 S2, no S3 or S4, no murmur, click or rub, no peripheral edema  ABDOMEN: soft, nontender, no hepatosplenomegaly, no masses and bowel sounds normal  MS: no gross musculoskeletal defects noted, no edema  SKIN: no suspicious lesions or rashes  PSYCH: mentation appears normal, affect normal/bright    Diagnostic Test Results:  Labs reviewed in Epic  Pending          Signed Electronically by: Swati Samuels, DNP, APRN-CNP       Chart documentation with Dragon Voice recognition Software. Although reviewed after completion, some words and grammatical errors may remain.

## 2024-03-15 NOTE — PATIENT INSTRUCTIONS
Increase Lexapro to 20 mg daily (can take 2 -  10 mg tabs)    Continue all other mediations without changes    Add in hydrochlorothiazide 12.5 mg daily     Schedule 2 week nurse only blood pressure check

## 2024-03-28 ENCOUNTER — ALLIED HEALTH/NURSE VISIT (OUTPATIENT)
Dept: FAMILY MEDICINE | Facility: CLINIC | Age: 44
End: 2024-03-28
Payer: COMMERCIAL

## 2024-03-28 ENCOUNTER — LAB (OUTPATIENT)
Dept: LAB | Facility: CLINIC | Age: 44
End: 2024-03-28
Payer: COMMERCIAL

## 2024-03-28 ENCOUNTER — TELEPHONE (OUTPATIENT)
Dept: FAMILY MEDICINE | Facility: CLINIC | Age: 44
End: 2024-03-28

## 2024-03-28 DIAGNOSIS — I10 BENIGN ESSENTIAL HYPERTENSION: Primary | ICD-10-CM

## 2024-03-28 DIAGNOSIS — I10 BENIGN ESSENTIAL HYPERTENSION: ICD-10-CM

## 2024-03-28 LAB
ANION GAP SERPL CALCULATED.3IONS-SCNC: 13 MMOL/L (ref 7–15)
BUN SERPL-MCNC: 15.9 MG/DL (ref 6–20)
CALCIUM SERPL-MCNC: 9.6 MG/DL (ref 8.6–10)
CHLORIDE SERPL-SCNC: 99 MMOL/L (ref 98–107)
CREAT SERPL-MCNC: 0.72 MG/DL (ref 0.67–1.17)
DEPRECATED HCO3 PLAS-SCNC: 27 MMOL/L (ref 22–29)
EGFRCR SERPLBLD CKD-EPI 2021: >90 ML/MIN/1.73M2
GLUCOSE SERPL-MCNC: 212 MG/DL (ref 70–99)
POTASSIUM SERPL-SCNC: 4.5 MMOL/L (ref 3.4–5.3)
SODIUM SERPL-SCNC: 139 MMOL/L (ref 135–145)

## 2024-03-28 PROCEDURE — 36415 COLL VENOUS BLD VENIPUNCTURE: CPT

## 2024-03-28 PROCEDURE — 99207 PR NO CHARGE NURSE ONLY: CPT

## 2024-03-28 PROCEDURE — 80048 BASIC METABOLIC PNL TOTAL CA: CPT

## 2024-03-28 NOTE — PROGRESS NOTES
Mt Painting is a 44 year old year old patient who comes in today for a Blood Pressure check because of new medication.  Vital Signs as repeated by /82  Patient is taking medication as prescribed  Patient is tolerating medications well.  Patient is monitoring Blood Pressure at home.  Average readings if yes are 145/80  Current complaints: none  Disposition:  will route to provider to review. Mt says he would like to stay on same dose and come back in 2 weeks for another BP check before he leaves for the year

## 2024-03-29 NOTE — TELEPHONE ENCOUNTER
Reviewed and okay with patient's wishes. If still high would increase medication.     Thanks,  Swati Samuels, DNP, APRN-CNP

## 2024-04-16 ENCOUNTER — MYC REFILL (OUTPATIENT)
Dept: FAMILY MEDICINE | Facility: CLINIC | Age: 44
End: 2024-04-16
Payer: COMMERCIAL

## 2024-04-16 DIAGNOSIS — F33.0 MILD EPISODE OF RECURRENT MAJOR DEPRESSIVE DISORDER (H): ICD-10-CM

## 2024-04-16 RX ORDER — ESCITALOPRAM OXALATE 20 MG/1
20 TABLET ORAL DAILY
Qty: 30 TABLET | Refills: 5 | Status: SHIPPED | OUTPATIENT
Start: 2024-04-16 | End: 2024-06-01

## 2024-06-01 ENCOUNTER — MYC REFILL (OUTPATIENT)
Dept: FAMILY MEDICINE | Facility: CLINIC | Age: 44
End: 2024-06-01
Payer: COMMERCIAL

## 2024-06-01 DIAGNOSIS — E78.5 HYPERLIPIDEMIA LDL GOAL <130: ICD-10-CM

## 2024-06-01 DIAGNOSIS — I10 BENIGN ESSENTIAL HYPERTENSION: ICD-10-CM

## 2024-06-01 DIAGNOSIS — E11.9 TYPE 2 DIABETES MELLITUS WITHOUT COMPLICATION, WITHOUT LONG-TERM CURRENT USE OF INSULIN (H): ICD-10-CM

## 2024-06-01 DIAGNOSIS — F33.0 MILD EPISODE OF RECURRENT MAJOR DEPRESSIVE DISORDER (H): ICD-10-CM

## 2024-06-02 DIAGNOSIS — I10 BENIGN ESSENTIAL HYPERTENSION: ICD-10-CM

## 2024-06-03 NOTE — TELEPHONE ENCOUNTER
Requested Prescriptions   Pending Prescriptions Disp Refills    hydroCHLOROthiazide 12.5 MG tablet [Pharmacy Med Name: hydroCHLOROthiazide 12.5 MG Oral Tablet] 30 tablet 0     Sig: Take 1 tablet by mouth once daily       Diuretics (Including Combos) Protocol Failed - 6/2/2024  2:46 PM        Failed - Blood pressure under 140/90 in past 12 months     BP Readings from Last 3 Encounters:   03/15/24 (!) 184/110   05/23/22 (!) 146/98   09/25/18 154/86       No data recorded            Passed - Medication is active on med list        Passed - Medication indicated for associated diagnosis     Medication is associated with one or more of the following diagnoses:     Edema   Hypertension   Heart Failure   Meniere's Disease          Passed - Has GFR on file in past 12 months and most recent value is normal        Passed - Recent (12 mo) or future (90 days) visit within the authorizing provider's specialty     The patient must have completed an in-person or virtual visit within the past 12 months or has a future visit scheduled within the next 90 days with the authorizing provider s specialty.  Urgent care and e-visits do not quality as an office visit for this protocol.          Passed - Patient is age 18 or older

## 2024-06-03 NOTE — TELEPHONE ENCOUNTER
Requested Prescriptions   Pending Prescriptions Disp Refills    metFORMIN (GLUCOPHAGE XR) 500 MG 24 hr tablet 360 tablet 0     Sig: Take 2 tablets (1,000 mg) by mouth 2 times daily (with meals)       Biguanide Agents Passed - 6/1/2024 12:29 AM        Passed - Patient is age 10 or older        Passed - Patient has documented A1c within the specified period of time.     If HgbA1C is 8 or greater, it needs to be on file within the past 3 months.  If less than 8, must be on file within the past 6 months.     Recent Labs   Lab Test 03/15/24  1202   A1C 7.9*             Passed - Patient does NOT have a diagnosis of CHF.        Passed - Medication is active on med list        Passed - Medication indicated for associated diagnosis     Medication is associated with one or more of the following diagnoses:     Gestational diabetes mellitus     Hyperinsulinar obesity     Hypersecretion of ovarian androgens    Non-alcoholic fatty liver    Polycystic ovarian syndrome               Pre-diabetes (DM 2 prevention)    Type 2 diabetes mellitus     Weight gain, antipsychotic therapy-induced             Passed - Has GFR on file in past 12 months and most recent value is normal        Passed - Recent (6 mo) or future (90 days) visit within the authorizing provider's specialty     The patient must have completed an in-person or virtual visit within the past 6 months or has a future visit scheduled within the next 90 days with the authorizing provider s specialty.  Urgent care and e-visits do not quality as an office visit for this protocol.            lisinopril (ZESTRIL) 40 MG tablet 30 tablet 0     Sig: Take 1 tablet (40 mg) by mouth daily       ACE Inhibitors (Including Combos) Protocol Failed - 6/1/2024 12:29 AM        Failed - Blood pressure under 140/90 in past 12 months- Clinicial or Patient Reported     BP Readings from Last 3 Encounters:   03/15/24 (!) 184/110   05/23/22 (!) 146/98   09/25/18 154/86       No data recorded             Passed - Medication is active on med list        Passed - Medication indicated for associated diagnosis     Medication is associated with one or more of the following diagnoses:     Chronic Kidney Disease (CKD)   Coronary Artery Disease (CAD)   Diabetes   Heart Failure (HF)   Hypertension (HTN)   Nephropathy            Passed - Has GFR on file in past 12 months and most recent value is normal        Passed - Recent (12 mo) or future (90 days) visit within the authorizing provider's specialty     The patient must have completed an in-person or virtual visit within the past 12 months or has a future visit scheduled within the next 90 days with the authorizing provider s specialty.  Urgent care and e-visits do not quality as an office visit for this protocol.          Passed - Patient is age 18 or older        Passed - Normal serum potassium on file in past 12 months     Recent Labs   Lab Test 03/28/24  0902   POTASSIUM 4.5               simvastatin (ZOCOR) 40 MG tablet 30 tablet 0     Sig: Take 1 tablet (40 mg) by mouth at bedtime       Antihyperlipidemic agents Failed - 6/1/2024 12:29 AM        Failed - LDL on file in the past 12 months        Passed - Medication is active on med list        Passed - Recent (12 mo) or future (90 days) visit within the authorizing provider's specialty     The patient must have completed an in-person or virtual visit within the past 12 months or has a future visit scheduled within the next 90 days with the authorizing provider s specialty.  Urgent care and e-visits do not quality as an office visit for this protocol.          Passed - Patient is age 18 years or older          hydroCHLOROthiazide 12.5 MG tablet 30 tablet 0     Sig: Take 1 tablet (12.5 mg) by mouth daily       Diuretics (Including Combos) Protocol Failed - 6/1/2024 12:29 AM        Failed - Blood pressure under 140/90 in past 12 months     BP Readings from Last 3 Encounters:   03/15/24 (!) 184/110   05/23/22 (!)  "146/98   09/25/18 154/86       No data recorded            Passed - Medication is active on med list        Passed - Medication indicated for associated diagnosis     Medication is associated with one or more of the following diagnoses:     Edema   Hypertension   Heart Failure   Meniere's Disease          Passed - Has GFR on file in past 12 months and most recent value is normal        Passed - Recent (12 mo) or future (90 days) visit within the authorizing provider's specialty     The patient must have completed an in-person or virtual visit within the past 12 months or has a future visit scheduled within the next 90 days with the authorizing provider s specialty.  Urgent care and e-visits do not quality as an office visit for this protocol.          Passed - Patient is age 18 or older          escitalopram (LEXAPRO) 20 MG tablet 30 tablet 5     Sig: Take 1 tablet (20 mg) by mouth daily BRAND name only       SSRIs Protocol Passed - 6/1/2024 12:29 AM        Passed - PHQ-9 score less than 5 in past 6 months     Please review last PHQ-9 score.           Passed - Medication is active on med list        Passed - Patient is age 18 or older        Passed - Recent (6 mo) or future (30 days) visit within the authorizing provider's specialty     Patient had office visit in the last 6 months or has a visit in the next 30 days with authorizing provider or within the authorizing provider's specialty.  See \"Patient Info\" tab in inbasket, or \"Choose Columns\" in Meds & Orders section of the refill encounter.                 "

## 2024-06-04 RX ORDER — ESCITALOPRAM OXALATE 20 MG/1
20 TABLET ORAL DAILY
Qty: 90 TABLET | Refills: 1 | Status: SHIPPED | OUTPATIENT
Start: 2024-06-04

## 2024-06-04 RX ORDER — HYDROCHLOROTHIAZIDE 12.5 MG/1
12.5 TABLET ORAL DAILY
Qty: 30 TABLET | Refills: 0 | OUTPATIENT
Start: 2024-06-04

## 2024-06-04 RX ORDER — SIMVASTATIN 40 MG
40 TABLET ORAL AT BEDTIME
Qty: 90 TABLET | Refills: 1 | Status: SHIPPED | OUTPATIENT
Start: 2024-06-04

## 2024-06-04 RX ORDER — LISINOPRIL 40 MG/1
40 TABLET ORAL DAILY
Qty: 90 TABLET | Refills: 1 | Status: SHIPPED | OUTPATIENT
Start: 2024-06-04

## 2024-06-04 RX ORDER — METFORMIN HCL 500 MG
1000 TABLET, EXTENDED RELEASE 24 HR ORAL 2 TIMES DAILY WITH MEALS
Qty: 360 TABLET | Refills: 0 | Status: SHIPPED | OUTPATIENT
Start: 2024-06-04

## 2024-06-04 RX ORDER — HYDROCHLOROTHIAZIDE 12.5 MG/1
12.5 TABLET ORAL DAILY
Qty: 90 TABLET | Refills: 1 | Status: SHIPPED | OUTPATIENT
Start: 2024-06-04

## 2024-06-23 ENCOUNTER — HEALTH MAINTENANCE LETTER (OUTPATIENT)
Age: 44
End: 2024-06-23

## 2024-07-14 ENCOUNTER — MYC REFILL (OUTPATIENT)
Dept: FAMILY MEDICINE | Facility: CLINIC | Age: 44
End: 2024-07-14
Payer: COMMERCIAL

## 2024-07-14 DIAGNOSIS — E78.5 HYPERLIPIDEMIA LDL GOAL <130: ICD-10-CM

## 2024-07-14 DIAGNOSIS — I10 BENIGN ESSENTIAL HYPERTENSION: ICD-10-CM

## 2024-07-14 DIAGNOSIS — F33.0 MILD EPISODE OF RECURRENT MAJOR DEPRESSIVE DISORDER (H): ICD-10-CM

## 2024-07-15 ENCOUNTER — MYC MEDICAL ADVICE (OUTPATIENT)
Dept: FAMILY MEDICINE | Facility: CLINIC | Age: 44
End: 2024-07-15
Payer: COMMERCIAL

## 2024-07-15 RX ORDER — HYDROCHLOROTHIAZIDE 12.5 MG/1
12.5 TABLET ORAL DAILY
Qty: 90 TABLET | Refills: 1 | OUTPATIENT
Start: 2024-07-15

## 2024-07-15 RX ORDER — SIMVASTATIN 40 MG
40 TABLET ORAL AT BEDTIME
Qty: 90 TABLET | Refills: 1 | OUTPATIENT
Start: 2024-07-15

## 2024-07-15 RX ORDER — ESCITALOPRAM OXALATE 20 MG/1
20 TABLET ORAL DAILY
Qty: 90 TABLET | Refills: 1 | OUTPATIENT
Start: 2024-07-15

## 2024-07-15 RX ORDER — LISINOPRIL 40 MG/1
40 TABLET ORAL DAILY
Qty: 90 TABLET | Refills: 1 | OUTPATIENT
Start: 2024-07-15

## 2024-07-22 ENCOUNTER — TELEPHONE (OUTPATIENT)
Dept: FAMILY MEDICINE | Facility: CLINIC | Age: 44
End: 2024-07-22

## 2024-07-22 NOTE — TELEPHONE ENCOUNTER
Prior Authorization Retail Medication Request    Medication/Dose: Lexapro 20mg Tab   Diagnosis and ICD code (if different than what is on RX):    New/renewal/insurance change PA/secondary ins. PA:  Previously Tried and Failed:    Rationale:      Insurance   Primary: [facility]  Insurance ID:  [id number]  Group ID: N/A  Ins Contact #: [phone]      Pharmacy Information (if different than what is on RX)  [facility]  Phone:    Fax: [phone]

## 2024-07-24 NOTE — TELEPHONE ENCOUNTER
PA Initiation    Medication: LEXAPRO 20 MG PO TABS  Insurance Company: [employer] - Phone [phone] Fax [phone]  Pharmacy Filling the Rx: [facility] [address] [place] [address]  Filling Pharmacy Phone: [phone]  Filling Pharmacy Fax:    Start Date: 7/24/2024  Retail Pharmacy Prior Authorization Team   Phone: [phone]

## 2024-07-25 NOTE — TELEPHONE ENCOUNTER
Prior Authorization Approval    Medication: LEXAPRO 20 MG PO TABS  Authorization Effective Date: 4/26/2024  Authorization Expiration Date: 7/25/2025  Approved Dose/Quantity: 30/30 Days  Reference #:     Insurance Company: [employer] - Phone [phone] Fax [phone]  Expected CoPay: $    CoPay Card Available:      Financial Assistance Needed: No  Which Pharmacy is filling the prescription: [facility] [address] [place] [address]  Pharmacy Notified: Yes  Patient Notified: The pharmacy will notify the patient.

## 2024-08-01 ENCOUNTER — TRANSFERRED RECORDS (OUTPATIENT)
Dept: MULTI SPECIALTY CLINIC | Facility: CLINIC | Age: 44
End: 2024-08-01

## 2024-08-01 LAB — RETINOPATHY: NORMAL

## 2024-10-07 ENCOUNTER — TELEPHONE (OUTPATIENT)
Dept: FAMILY MEDICINE | Facility: CLINIC | Age: 44
End: 2024-10-07
Payer: COMMERCIAL

## 2024-10-07 NOTE — TELEPHONE ENCOUNTER
Patient Quality Outreach    Patient is due for the following:   Diabetes -  A1C, LDL (Fasting), Eye Exam, Microalbumin, Diabetic Follow-Up Visit, and Foot Exam  Hypertension -  Hypertension follow-up visit  Depression  -  PHQ-9 needed  Physical Preventive Adult Physical    Next Steps:   Schedule a Adult Preventative  Patient was assigned appropriate questionnaire to complete    Type of outreach:    Sent [employer] message.      Questions for provider review:    None           Bailee Kahler

## 2024-10-14 ENCOUNTER — MYC REFILL (OUTPATIENT)
Dept: FAMILY MEDICINE | Facility: CLINIC | Age: 44
End: 2024-10-14
Payer: COMMERCIAL

## 2024-10-14 DIAGNOSIS — F33.0 MILD EPISODE OF RECURRENT MAJOR DEPRESSIVE DISORDER (H): ICD-10-CM

## 2024-10-18 RX ORDER — ESCITALOPRAM OXALATE 20 MG/1
20 TABLET ORAL DAILY
Qty: 90 TABLET | Refills: 0 | Status: SHIPPED | OUTPATIENT
Start: 2024-10-18

## 2024-11-10 ENCOUNTER — HEALTH MAINTENANCE LETTER (OUTPATIENT)
Age: 44
End: 2024-11-10

## 2024-12-10 ENCOUNTER — TELEPHONE (OUTPATIENT)
Dept: FAMILY MEDICINE | Facility: CLINIC | Age: 44
End: 2024-12-10
Payer: COMMERCIAL

## 2024-12-10 NOTE — TELEPHONE ENCOUNTER
Patient Quality Outreach    Patient is due for the following:   Hypertension -  BP check    Action(s) Taken:   Schedule a nurse only visit for BP Recheck    Type of outreach:    Sent [employer] message.    Questions for provider review:    None           Mine Franklin CMA

## 2024-12-23 ENCOUNTER — MYC REFILL (OUTPATIENT)
Dept: FAMILY MEDICINE | Facility: CLINIC | Age: 44
End: 2024-12-23
Payer: COMMERCIAL

## 2024-12-23 DIAGNOSIS — E78.5 HYPERLIPIDEMIA LDL GOAL <130: ICD-10-CM

## 2024-12-23 DIAGNOSIS — F33.0 MILD EPISODE OF RECURRENT MAJOR DEPRESSIVE DISORDER (H): ICD-10-CM

## 2024-12-23 DIAGNOSIS — I10 BENIGN ESSENTIAL HYPERTENSION: ICD-10-CM

## 2024-12-23 DIAGNOSIS — E11.9 TYPE 2 DIABETES MELLITUS WITHOUT COMPLICATION, WITHOUT LONG-TERM CURRENT USE OF INSULIN (H): ICD-10-CM

## 2024-12-23 RX ORDER — ESCITALOPRAM OXALATE 20 MG/1
20 TABLET ORAL DAILY
Qty: 90 TABLET | Refills: 0 | OUTPATIENT
Start: 2024-12-23

## 2024-12-23 RX ORDER — METFORMIN HYDROCHLORIDE 500 MG/1
1000 TABLET, EXTENDED RELEASE ORAL 2 TIMES DAILY WITH MEALS
Qty: 360 TABLET | Refills: 0 | OUTPATIENT
Start: 2024-12-23

## 2024-12-23 RX ORDER — METFORMIN HYDROCHLORIDE 500 MG/1
1000 TABLET, EXTENDED RELEASE ORAL 2 TIMES DAILY WITH MEALS
Qty: 120 TABLET | Refills: 0 | Status: SHIPPED | OUTPATIENT
Start: 2024-12-23

## 2024-12-23 RX ORDER — LISINOPRIL 40 MG/1
40 TABLET ORAL DAILY
Qty: 90 TABLET | Refills: 0 | Status: SHIPPED | OUTPATIENT
Start: 2024-12-23

## 2024-12-23 RX ORDER — SIMVASTATIN 40 MG
40 TABLET ORAL AT BEDTIME
Qty: 90 TABLET | Refills: 0 | Status: SHIPPED | OUTPATIENT
Start: 2024-12-23

## 2024-12-23 RX ORDER — ESCITALOPRAM OXALATE 20 MG/1
20 TABLET ORAL DAILY
Qty: 30 TABLET | Refills: 0 | Status: SHIPPED | OUTPATIENT
Start: 2024-12-23

## 2024-12-23 RX ORDER — HYDROCHLOROTHIAZIDE 12.5 MG/1
12.5 TABLET ORAL DAILY
Qty: 90 TABLET | Refills: 0 | Status: SHIPPED | OUTPATIENT
Start: 2024-12-23

## 2024-12-23 NOTE — TELEPHONE ENCOUNTER
Medication Question or Refill    Contacts       Contact Date/Time Type Contact Phone/Fax    12/23/2024 10:20 AM CST Interface (Incoming) [facility] [address] [place] [address] (Pharmacy) [phone]            What medication are you calling about (include dose and sig)?: Pending Prescriptions:                       Disp   Refills    simvastatin (ZOCOR) 40 MG tablet [Pharmac*90 tab*0            Sig: TAKE 1 TABLET BY MOUTH AT BEDTIME    lisinopril (ZESTRIL) 40 MG tablet [Pharma*90 tab*0            Sig: Take 1 tablet by mouth once daily    hydroCHLOROthiazide 12.5 MG tablet [Pharm*90 tab*0            Sig: Take 1 tablet by mouth once daily    metFORMIN (GLUCOPHAGE XR) 500 MG 24 hr ta*360 ta*0            Sig: Take 2 tablets (1,000 mg) by mouth 2 times daily           (with meals).    escitalopram (LEXAPRO) 20 MG tablet       90 tab*0            Sig: Take 1 tablet (20 mg) by mouth daily. BRAND name           only        Preferred Pharmacy:     [address]  [address] [facility] [address]  Phone: [phone] Fax: [phone]        Controlled Substance Agreement on file:   CSA -- Patient Level:    CSA: None found at the patient level.       Who prescribed the medication?: Labine    Do you need a refill? Yes    When did you use the medication last? 12/22    Patient offered an appointment? No pt states he will call and make a lab appt    Do you have any questions or concerns?  Yes: pt requesting fill on all meds, pt states [place] out of state 6 months out of the year and his meds are currently lost in [facility] [place]      Could we send this information to you in [facility] or would you prefer to receive a phone call?:   Patient would prefer a phone call   Okay to leave a detailed message?: Yes at Home number on file [phone] (home)

## 2025-02-05 ENCOUNTER — MYC MEDICAL ADVICE (OUTPATIENT)
Dept: FAMILY MEDICINE | Facility: CLINIC | Age: 45
End: 2025-02-05
Payer: COMMERCIAL

## 2025-02-05 DIAGNOSIS — F33.0 MILD EPISODE OF RECURRENT MAJOR DEPRESSIVE DISORDER: ICD-10-CM

## 2025-02-05 DIAGNOSIS — E11.9 TYPE 2 DIABETES MELLITUS WITHOUT COMPLICATION, WITHOUT LONG-TERM CURRENT USE OF INSULIN (H): ICD-10-CM

## 2025-02-05 DIAGNOSIS — I10 BENIGN ESSENTIAL HYPERTENSION: ICD-10-CM

## 2025-02-05 DIAGNOSIS — E78.5 HYPERLIPIDEMIA LDL GOAL <130: ICD-10-CM

## 2025-02-05 NOTE — TELEPHONE ENCOUNTER
Patient is overdue for needed care, please assist patient with scheduling an annual preventative exam/diabetic follow up. Once appointment is scheduled, please route back to med refill pool.    Julie Behrendt RN

## 2025-02-05 NOTE — TELEPHONE ENCOUNTER
Medication Question or Refill      What medication are you calling about (include dose and sig)?: Pending Prescriptions:                       Disp   Refills    metFORMIN (GLUCOPHAGE XR) 500 MG 24 hr ta*120 ta*0            Sig: Take 2 tablets (1,000 mg) by mouth 2 times daily           (with meals).    lisinopril (ZESTRIL) 40 MG tablet         90 tab*0            Sig: Take 1 tablet (40 mg) by mouth daily.    hydroCHLOROthiazide 12.5 MG tablet        90 tab*0            Sig: Take 1 tablet (12.5 mg) by mouth daily.    escitalopram (LEXAPRO) 20 MG tablet       30 tab*0            Sig: Take 1 tablet (20 mg) by mouth daily. BRAND name           only    simvastatin (ZOCOR) 40 MG tablet          90 tab*0            Sig: Take 1 tablet (40 mg) by mouth at bedtime.    Preferred Pharmacy:   [facility] [address] [place] [address] [address] [place] [address]  [address] [facility] [address]  Phone: [phone] Fax: [phone]    Controlled Substance Agreement on file:   CSA -- Patient Level:    CSA: None found at the patient level.       Who prescribed the medication?: Miles Cam MD     Do you need a refill? Yes    Do you have any questions or concerns?  Yes: escitalopram (LEXAPRO) 20 MG tablet Needs to be Brand name only      Could we send this information to you in [facility] or would you prefer to receive a phone call?:   Patient would prefer a phone call   Okay to leave a detailed message?: Yes at Home number on file [phone]   (home)    Deyanira Myers/ Patient

## 2025-02-06 ENCOUNTER — PATIENT OUTREACH (OUTPATIENT)
Dept: CARE COORDINATION | Facility: CLINIC | Age: 45
End: 2025-02-06
Payer: COMMERCIAL

## 2025-02-06 RX ORDER — LISINOPRIL 40 MG/1
40 TABLET ORAL DAILY
Qty: 30 TABLET | Refills: 0 | Status: SHIPPED | OUTPATIENT
Start: 2025-02-06

## 2025-02-06 RX ORDER — METFORMIN HYDROCHLORIDE 500 MG/1
1000 TABLET, EXTENDED RELEASE ORAL 2 TIMES DAILY WITH MEALS
Qty: 120 TABLET | Refills: 0 | Status: SHIPPED | OUTPATIENT
Start: 2025-02-06

## 2025-02-06 RX ORDER — HYDROCHLOROTHIAZIDE 12.5 MG/1
12.5 TABLET ORAL DAILY
Qty: 30 TABLET | Refills: 0 | Status: SHIPPED | OUTPATIENT
Start: 2025-02-06

## 2025-02-06 RX ORDER — ESCITALOPRAM OXALATE 20 MG/1
20 TABLET ORAL DAILY
Qty: 30 TABLET | Refills: 0 | Status: SHIPPED | OUTPATIENT
Start: 2025-02-06

## 2025-02-06 RX ORDER — SIMVASTATIN 40 MG
40 TABLET ORAL AT BEDTIME
Qty: 30 TABLET | Refills: 0 | Status: SHIPPED | OUTPATIENT
Start: 2025-02-06

## 2025-03-03 ENCOUNTER — OFFICE VISIT (OUTPATIENT)
Dept: FAMILY MEDICINE | Facility: CLINIC | Age: 45
End: 2025-03-03
Payer: COMMERCIAL

## 2025-03-03 VITALS
DIASTOLIC BLOOD PRESSURE: 80 MMHG | RESPIRATION RATE: 20 BRPM | OXYGEN SATURATION: 96 % | BODY MASS INDEX: 37.93 KG/M2 | HEART RATE: 80 BPM | TEMPERATURE: 98 F | SYSTOLIC BLOOD PRESSURE: 122 MMHG | HEIGHT: 72 IN | WEIGHT: 280 LBS

## 2025-03-03 DIAGNOSIS — E11.9 TYPE 2 DIABETES MELLITUS WITHOUT COMPLICATION, WITHOUT LONG-TERM CURRENT USE OF INSULIN (H): ICD-10-CM

## 2025-03-03 DIAGNOSIS — I10 BENIGN ESSENTIAL HYPERTENSION: ICD-10-CM

## 2025-03-03 DIAGNOSIS — F33.0 MILD EPISODE OF RECURRENT MAJOR DEPRESSIVE DISORDER: ICD-10-CM

## 2025-03-03 DIAGNOSIS — E66.01 MORBID OBESITY (H): ICD-10-CM

## 2025-03-03 DIAGNOSIS — E78.5 HYPERLIPIDEMIA LDL GOAL <130: ICD-10-CM

## 2025-03-03 LAB
ANION GAP SERPL CALCULATED.3IONS-SCNC: 9 MMOL/L (ref 7–15)
BUN SERPL-MCNC: 18.1 MG/DL (ref 6–20)
CALCIUM SERPL-MCNC: 9.9 MG/DL (ref 8.8–10.4)
CHLORIDE SERPL-SCNC: 100 MMOL/L (ref 98–107)
CHOLEST SERPL-MCNC: 169 MG/DL
CREAT SERPL-MCNC: 0.79 MG/DL (ref 0.67–1.17)
CREAT UR-MCNC: 87.2 MG/DL
EGFRCR SERPLBLD CKD-EPI 2021: >90 ML/MIN/1.73M2
EST. AVERAGE GLUCOSE BLD GHB EST-MCNC: 220 MG/DL
FASTING STATUS PATIENT QL REPORTED: NO
FASTING STATUS PATIENT QL REPORTED: NO
GLUCOSE SERPL-MCNC: 245 MG/DL (ref 70–99)
HBA1C MFR BLD: 9.3 % (ref 0–5.6)
HCO3 SERPL-SCNC: 30 MMOL/L (ref 22–29)
HDLC SERPL-MCNC: 34 MG/DL
LDLC SERPL CALC-MCNC: 93 MG/DL
MICROALBUMIN UR-MCNC: <12 MG/L
MICROALBUMIN/CREAT UR: NORMAL MG/G{CREAT}
NONHDLC SERPL-MCNC: 135 MG/DL
POTASSIUM SERPL-SCNC: 4.2 MMOL/L (ref 3.4–5.3)
SODIUM SERPL-SCNC: 139 MMOL/L (ref 135–145)
TRIGL SERPL-MCNC: 208 MG/DL

## 2025-03-03 PROCEDURE — 99207 PR FOOT EXAM NO CHARGE: CPT | Performed by: NURSE PRACTITIONER

## 2025-03-03 PROCEDURE — 80061 LIPID PANEL: CPT | Performed by: NURSE PRACTITIONER

## 2025-03-03 PROCEDURE — 80048 BASIC METABOLIC PNL TOTAL CA: CPT | Performed by: NURSE PRACTITIONER

## 2025-03-03 PROCEDURE — 99214 OFFICE O/P EST MOD 30 MIN: CPT | Performed by: NURSE PRACTITIONER

## 2025-03-03 PROCEDURE — 36415 COLL VENOUS BLD VENIPUNCTURE: CPT | Performed by: NURSE PRACTITIONER

## 2025-03-03 PROCEDURE — 83036 HEMOGLOBIN GLYCOSYLATED A1C: CPT | Performed by: NURSE PRACTITIONER

## 2025-03-03 PROCEDURE — 82043 UR ALBUMIN QUANTITATIVE: CPT | Performed by: NURSE PRACTITIONER

## 2025-03-03 PROCEDURE — G2211 COMPLEX E/M VISIT ADD ON: HCPCS | Performed by: NURSE PRACTITIONER

## 2025-03-03 PROCEDURE — 82570 ASSAY OF URINE CREATININE: CPT | Performed by: NURSE PRACTITIONER

## 2025-03-03 RX ORDER — LISINOPRIL 40 MG/1
40 TABLET ORAL DAILY
Qty: 90 TABLET | Refills: 1 | Status: SHIPPED | OUTPATIENT
Start: 2025-03-03

## 2025-03-03 RX ORDER — HYDROCHLOROTHIAZIDE 12.5 MG/1
12.5 TABLET ORAL DAILY
Qty: 90 TABLET | Refills: 1 | Status: SHIPPED | OUTPATIENT
Start: 2025-03-03

## 2025-03-03 RX ORDER — ESCITALOPRAM OXALATE 20 MG/1
20 TABLET ORAL DAILY
Qty: 90 TABLET | Refills: 1 | Status: SHIPPED | OUTPATIENT
Start: 2025-03-03

## 2025-03-03 RX ORDER — METFORMIN HYDROCHLORIDE 500 MG/1
1000 TABLET, EXTENDED RELEASE ORAL 2 TIMES DAILY WITH MEALS
Qty: 360 TABLET | Refills: 1 | Status: SHIPPED | OUTPATIENT
Start: 2025-03-03

## 2025-03-03 RX ORDER — SIMVASTATIN 40 MG
40 TABLET ORAL AT BEDTIME
Qty: 90 TABLET | Refills: 1 | Status: SHIPPED | OUTPATIENT
Start: 2025-03-03

## 2025-03-03 ASSESSMENT — ANXIETY QUESTIONNAIRES
5. BEING SO RESTLESS THAT IT IS HARD TO SIT STILL: NOT AT ALL
GAD7 TOTAL SCORE: 0
6. BECOMING EASILY ANNOYED OR IRRITABLE: NOT AT ALL
7. FEELING AFRAID AS IF SOMETHING AWFUL MIGHT HAPPEN: NOT AT ALL
1. FEELING NERVOUS, ANXIOUS, OR ON EDGE: NOT AT ALL
GAD7 TOTAL SCORE: 0
3. WORRYING TOO MUCH ABOUT DIFFERENT THINGS: NOT AT ALL
GAD7 TOTAL SCORE: 0
2. NOT BEING ABLE TO STOP OR CONTROL WORRYING: NOT AT ALL
IF YOU CHECKED OFF ANY PROBLEMS ON THIS QUESTIONNAIRE, HOW DIFFICULT HAVE THESE PROBLEMS MADE IT FOR YOU TO DO YOUR WORK, TAKE CARE OF THINGS AT HOME, OR GET ALONG WITH OTHER PEOPLE: NOT DIFFICULT AT ALL
4. TROUBLE RELAXING: NOT AT ALL
7. FEELING AFRAID AS IF SOMETHING AWFUL MIGHT HAPPEN: NOT AT ALL
8. IF YOU CHECKED OFF ANY PROBLEMS, HOW DIFFICULT HAVE THESE MADE IT FOR YOU TO DO YOUR WORK, TAKE CARE OF THINGS AT HOME, OR GET ALONG WITH OTHER PEOPLE?: NOT DIFFICULT AT ALL

## 2025-03-03 ASSESSMENT — PATIENT HEALTH QUESTIONNAIRE - PHQ9
SUM OF ALL RESPONSES TO PHQ QUESTIONS 1-9: 4
SUM OF ALL RESPONSES TO PHQ QUESTIONS 1-9: 4
10. IF YOU CHECKED OFF ANY PROBLEMS, HOW DIFFICULT HAVE THESE PROBLEMS MADE IT FOR YOU TO DO YOUR WORK, TAKE CARE OF THINGS AT HOME, OR GET ALONG WITH OTHER PEOPLE: NOT DIFFICULT AT ALL

## 2025-03-03 ASSESSMENT — PAIN SCALES - GENERAL: PAINLEVEL_OUTOF10: NO PAIN (0)

## 2025-03-03 NOTE — PROGRESS NOTES
{PROVIDER CHARTING PREFERENCE:274969}    Michelle Amor is a 45 year old, presenting for the following health issues:  Recheck Medication        3/3/2025    11:49 AM   Additional Questions   Roomed by Gena JENKINS CMA     Via the Health Maintenance questionnaire, the patient has reported the following services have been completed -Eye Exam: [facility] 2024-08-01, this information has been sent to the abstraction team.    History of Present Illness       Diabetes:   He presents for follow up of diabetes.  He is checking home blood glucose a few times a month.   He checks blood glucose before and after meals.  Blood glucose is sometimes over 200 and never under 70. He is aware of hypoglycemia symptoms including shakiness, weakness and lethargy.   He is concerned about blood sugar frequently over 200.    He is not experiencing numbness or burning in feet, excessive thirst, blurry vision, weight changes or redness, sores or blisters on feet. The patient has had a diabetic eye exam in the last 12 months. Eye exam performed on august 2024. Location of last eye exam [facility].        He eats 2-3 servings of fruits and vegetables daily.He consumes 0 sweetened beverage(s) daily.He exercises with enough effort to increase his heart rate 20 to 29 minutes per day.  He exercises with enough effort to increase his heart rate 3 or less days per week. He is missing 1 dose(s) of medications per week.        Wt Readings from Last 3 Encounters:   03/03/25 127 kg (280 lb)   03/15/24 120.1 kg (264 lb 12.8 oz)   05/23/22 128.4 kg (283 lb)        Hyperlipidemia Follow-Up    Are you regularly taking any medication or supplement to lower your cholesterol?   Yes- Simvastatin 40 mg  Are you having muscle aches or other side effects that you think could be caused by your cholesterol lowering medication?  No    Hypertension Follow-up    Do you check your blood pressure regularly outside of the clinic? No   Are  you following a low salt diet? Yes  Are your blood pressures ever more than 140 on the top number (systolic) OR more   than 90 on the bottom number (diastolic), for example 140/90? No    BP Readings from Last 6 Encounters:   03/03/25 122/80   03/15/24 (!) 184/110   05/23/22 (!) 146/98   09/25/18 154/86   09/07/18 (!) 160/98   12/29/17 (!) 160/96        Depression   How are you doing with your depression since your last visit? No change  Are you having other symptoms that might be associated with depression? No  Have you had a significant life event?  No   Are you feeling anxious or having panic attacks?   No  Do you have any concerns with your use of alcohol or other drugs? No    Social History     Tobacco Use    Smoking status: Every Day     Current packs/day: 1.00     Types: Cigarettes    Smokeless tobacco: Current   Vaping Use    Vaping status: Never Used   Substance Use Topics    Alcohol use: Yes     Comment: very rare    Drug use: No         5/23/2023    10:48 AM 3/15/2024    10:53 AM 3/3/2025    11:29 AM   PHQ   PHQ-9 Total Score 0 0 4    Q9: Thoughts of better off dead/self-harm past 2 weeks Not at all Not at all Not at all       Patient-reported         11/16/2021     9:28 AM 3/15/2024    10:54 AM 3/3/2025    11:30 AM   MAX-7 SCORE   Total Score  0 (minimal anxiety) 0 (minimal anxiety)   Total Score 0 0 0        Patient-reported         Suicide Assessment Five-step Evaluation and Treatment (SAFE-T)  {Provider  Link to Depression Care Package SmartSet :395464}    {additonal problems for provider to add (Optional):108224}    {ROS Picklists (Optional):537015}      Objective    /80 (BP Location: Right arm, Patient Position: Sitting, Cuff Size: Adult Large)   Pulse 80   Temp 98  F (36.7  C) (Tympanic)   Resp 20   Ht 1.829 m (6')   Wt 127 kg (280 lb)   SpO2 96%   BMI 37.97 kg/m    Body mass index is 37.97 kg/m .  Physical Exam   {Exam List (Optional):051125}    {Diagnostic Test Results  (Optional):511079}        Signed Electronically by: Swati Samuels DNP  {Email feedback regarding this note to [email or web address]   :408009}   (SAFE-T)          Review of Systems  Constitutional, neuro, ENT, endocrine, pulmonary, cardiac, gastrointestinal, genitourinary, musculoskeletal, integument and psychiatric systems are negative, except as otherwise noted.      Objective    /80 (BP Location: Right arm, Patient Position: Sitting, Cuff Size: Adult Large)   Pulse 80   Temp 98  F (36.7  C) (Tympanic)   Resp 20   Ht 1.829 m (6')   Wt 127 kg (280 lb)   SpO2 96%   BMI 37.97 kg/m    Body mass index is 37.97 kg/m .  Physical Exam   GENERAL: alert and no distress  EYES: Eyes grossly normal to inspection, PERRL and conjunctivae and sclerae normal  HENT: ear canals and TM's normal, nose and mouth without ulcers or lesions  NECK: no adenopathy, no asymmetry, masses, or scars  RESP: lungs clear to auscultation - no rales, rhonchi or wheezes  CV: regular rate and rhythm, normal S1 S2, no S3 or S4, no murmur, click or rub, no peripheral edema  ABDOMEN: soft, nontender, no hepatosplenomegaly, no masses and bowel sounds normal  MS: no gross musculoskeletal defects noted, no edema  SKIN: no suspicious lesions or rashes  NEURO: Normal strength and tone, mentation intact and speech normal  PSYCH: mentation appears normal, affect normal/bright    Results for orders placed or performed in visit on 03/03/25   Albumin Random Urine Quantitative with Creat Ratio     Status: None   Result Value Ref Range    Creatinine Urine mg/dL 87.2 mg/dL    Albumin Urine mg/L <12.0 mg/L    Albumin Urine mg/g Cr     Lipid panel reflex to direct LDL Non-fasting     Status: Abnormal   Result Value Ref Range    Cholesterol 169 <200 mg/dL    Triglycerides 208 (H) <150 mg/dL    Direct Measure HDL 34 (L) >=40 mg/dL    LDL Cholesterol Calculated 93 <100 mg/dL    Non HDL Cholesterol 135 (H) <130 mg/dL    Patient Fasting > 8hrs? No     Narrative    Cholesterol  Desirable: < 200 mg/dL  Borderline High: 200 - 239 mg/dL  High: >= 240 mg/dL    Triglycerides  Normal: < 150 mg/dL  Borderline  High: 150 - 199 mg/dL  High: 200-499 mg/dL  Very High: >= 500 mg/dL    Direct Measure HDL  Female: >= 50 mg/dL   Male: >= 40 mg/dL    LDL Cholesterol  Desirable: < 100 mg/dL  Above Desirable: 100 - 129 mg/dL   Borderline High: 130 - 159 mg/dL   High:  160 - 189 mg/dL   Very High: >= 190 mg/dL    Non HDL Cholesterol  Desirable: < 130 mg/dL  Above Desirable: 130 - 159 mg/dL  Borderline High: 160 - 189 mg/dL  High: 190 - 219 mg/dL  Very High: >= 220 mg/dL   HEMOGLOBIN A1C     Status: Abnormal   Result Value Ref Range    Estimated Average Glucose 220 (H) <117 mg/dL    Hemoglobin A1C 9.3 (H) 0.0 - 5.6 %    Narrative    Results consistent with previous, repeat testing unnecessary     BASIC METABOLIC PANEL     Status: Abnormal   Result Value Ref Range    Sodium 139 135 - 145 mmol/L    Potassium 4.2 3.4 - 5.3 mmol/L    Chloride 100 98 - 107 mmol/L    Carbon Dioxide (CO2) 30 (H) 22 - 29 mmol/L    Anion Gap 9 7 - 15 mmol/L    Urea Nitrogen 18.1 6.0 - 20.0 mg/dL    Creatinine 0.79 0.67 - 1.17 mg/dL    GFR Estimate >90 >60 mL/min/1.73m2    Calcium 9.9 8.8 - 10.4 mg/dL    Glucose 245 (H) 70 - 99 mg/dL    Patient Fasting > 8hrs? No            Signed Electronically by: Swati Samuels DNP      Chart documentation with Dragon Voice recognition Software. Although reviewed after completion, some words and grammatical errors may remain.

## 2025-03-04 DIAGNOSIS — E11.9 TYPE 2 DIABETES MELLITUS WITHOUT COMPLICATION, WITHOUT LONG-TERM CURRENT USE OF INSULIN (H): Primary | ICD-10-CM

## 2025-03-04 RX ORDER — GLIPIZIDE 5 MG/1
5 TABLET, FILM COATED, EXTENDED RELEASE ORAL DAILY
Qty: 90 TABLET | Refills: 1 | Status: SHIPPED | OUTPATIENT
Start: 2025-03-04

## 2025-03-09 NOTE — PATIENT INSTRUCTIONS
Type 2 diabetes mellitus without complication, without long-term current use of insulin (H)  Chronic, previously borderline stable with A1c of 7.9.  Patient currently not checking blood sugars, does not have his monitor.  Trying to work on improving diet and has been less active that he has not been working.  Heading back to work and may.  Taking medications as prescribed.  Will recheck levels and notify patient results and further recommendations.  Follow-up in 6 months.  - Albumin Random Urine Quantitative with Creat Ratio; Future  - Lipid panel reflex to direct LDL Non-fasting; Future  - HEMOGLOBIN A1C; Future  - metFORMIN (GLUCOPHAGE XR) 500 MG 24 hr tablet; Take 2 tablets (1,000 mg) by mouth 2 times daily (with meals).  - FOOT EXAM  - Albumin Random Urine Quantitative with Creat Ratio  - Lipid panel reflex to direct LDL Non-fasting  - HEMOGLOBIN A1C    Benign essential hypertension  Chronic, stable.  Blood pressure below target goal in office today.  Will continue medications without any changes and lab work.  - BASIC METABOLIC PANEL; Future  - hydroCHLOROthiazide 12.5 MG tablet; Take 1 tablet (12.5 mg) by mouth daily.  - lisinopril (ZESTRIL) 40 MG tablet; Take 1 tablet (40 mg) by mouth daily.  - BASIC METABOLIC PANEL    Morbid obesity (H)  Chronic, weight up since last visit.  Patient has not been working over the winter.  Has been more sedentary.  Discussed diet and exercise at length.  Patient starting work back up in May.  Encouraged to focus on a healthy diet.    Mild episode of recurrent major depressive disorder  Chronic, stable on current dose of Lexapro.  Will continue without any changes.  - escitalopram (LEXAPRO) 20 MG tablet; Take 1 tablet (20 mg) by mouth daily. BRAND name only    Hyperlipidemia LDL goal <130  Chronic, stable on statin.  Tolerating well.  No changes, will recheck cholesterol levels.  - simvastatin (ZOCOR) 40 MG tablet; Take 1 tablet (40 mg) by mouth at bedtime.  - Lipid panel  reflex to direct LDL Non-fasting; Future

## 2025-03-17 ENCOUNTER — MYC REFILL (OUTPATIENT)
Dept: FAMILY MEDICINE | Facility: CLINIC | Age: 45
End: 2025-03-17
Payer: COMMERCIAL

## 2025-03-17 DIAGNOSIS — F33.0 MILD EPISODE OF RECURRENT MAJOR DEPRESSIVE DISORDER: ICD-10-CM

## 2025-03-18 RX ORDER — ESCITALOPRAM OXALATE 20 MG/1
20 TABLET ORAL DAILY
Qty: 90 TABLET | Refills: 1 | Status: SHIPPED | OUTPATIENT
Start: 2025-03-18

## 2025-06-24 ENCOUNTER — TELEPHONE (OUTPATIENT)
Dept: FAMILY MEDICINE | Facility: CLINIC | Age: 45
End: 2025-06-24
Payer: COMMERCIAL

## 2025-06-24 NOTE — TELEPHONE ENCOUNTER
Patient Quality Outreach    Patient is due for the following:   Diabetes -  A1C    Action(s) Taken:   Schedule a office visit for diabetes    Type of outreach:    Sent [employer] message.    Questions for provider review:    None         Mine Franklin CMA  Chart routed to None.

## 2025-07-12 ENCOUNTER — HEALTH MAINTENANCE LETTER (OUTPATIENT)
Age: 45
End: 2025-07-12